# Patient Record
Sex: FEMALE | Race: BLACK OR AFRICAN AMERICAN | NOT HISPANIC OR LATINO | URBAN - METROPOLITAN AREA
[De-identification: names, ages, dates, MRNs, and addresses within clinical notes are randomized per-mention and may not be internally consistent; named-entity substitution may affect disease eponyms.]

---

## 2020-10-27 ENCOUNTER — INPATIENT (INPATIENT)
Facility: HOSPITAL | Age: 65
LOS: 1 days | Discharge: HOME | End: 2020-10-29
Attending: INTERNAL MEDICINE | Admitting: INTERNAL MEDICINE
Payer: MEDICAID

## 2020-10-27 VITALS
SYSTOLIC BLOOD PRESSURE: 148 MMHG | TEMPERATURE: 98 F | DIASTOLIC BLOOD PRESSURE: 94 MMHG | WEIGHT: 207.01 LBS | HEART RATE: 59 BPM | OXYGEN SATURATION: 100 % | HEIGHT: 66 IN | RESPIRATION RATE: 20 BRPM

## 2020-10-27 DIAGNOSIS — Z29.9 ENCOUNTER FOR PROPHYLACTIC MEASURES, UNSPECIFIED: ICD-10-CM

## 2020-10-27 DIAGNOSIS — E87.6 HYPOKALEMIA: ICD-10-CM

## 2020-10-27 DIAGNOSIS — R07.9 CHEST PAIN, UNSPECIFIED: ICD-10-CM

## 2020-10-27 DIAGNOSIS — I10 ESSENTIAL (PRIMARY) HYPERTENSION: ICD-10-CM

## 2020-10-27 DIAGNOSIS — Z98.890 OTHER SPECIFIED POSTPROCEDURAL STATES: Chronic | ICD-10-CM

## 2020-10-27 LAB
ALBUMIN SERPL ELPH-MCNC: 4.1 G/DL — SIGNIFICANT CHANGE UP (ref 3.3–5)
ALP SERPL-CCNC: 73 U/L — SIGNIFICANT CHANGE UP (ref 40–120)
ALT FLD-CCNC: 28 U/L — SIGNIFICANT CHANGE UP (ref 12–78)
ANION GAP SERPL CALC-SCNC: 5 MMOL/L — SIGNIFICANT CHANGE UP (ref 5–17)
AST SERPL-CCNC: 24 U/L — SIGNIFICANT CHANGE UP (ref 15–37)
BASOPHILS # BLD AUTO: 0.02 K/UL — SIGNIFICANT CHANGE UP (ref 0–0.2)
BASOPHILS NFR BLD AUTO: 0.3 % — SIGNIFICANT CHANGE UP (ref 0–2)
BILIRUB SERPL-MCNC: 0.6 MG/DL — SIGNIFICANT CHANGE UP (ref 0.2–1.2)
BUN SERPL-MCNC: 17 MG/DL — SIGNIFICANT CHANGE UP (ref 7–23)
CALCIUM SERPL-MCNC: 9.2 MG/DL — SIGNIFICANT CHANGE UP (ref 8.5–10.1)
CHLORIDE SERPL-SCNC: 108 MMOL/L — SIGNIFICANT CHANGE UP (ref 96–108)
CHOLEST SERPL-MCNC: 211 MG/DL — HIGH
CO2 SERPL-SCNC: 28 MMOL/L — SIGNIFICANT CHANGE UP (ref 22–31)
CREAT SERPL-MCNC: 0.98 MG/DL — SIGNIFICANT CHANGE UP (ref 0.5–1.3)
EOSINOPHIL # BLD AUTO: 0.08 K/UL — SIGNIFICANT CHANGE UP (ref 0–0.5)
EOSINOPHIL NFR BLD AUTO: 1.2 % — SIGNIFICANT CHANGE UP (ref 0–6)
GLUCOSE SERPL-MCNC: 131 MG/DL — HIGH (ref 70–99)
HCT VFR BLD CALC: 37.2 % — SIGNIFICANT CHANGE UP (ref 34.5–45)
HDLC SERPL-MCNC: 47 MG/DL — LOW
HGB BLD-MCNC: 12.2 G/DL — SIGNIFICANT CHANGE UP (ref 11.5–15.5)
IMM GRANULOCYTES NFR BLD AUTO: 0.1 % — SIGNIFICANT CHANGE UP (ref 0–1.5)
LIDOCAIN IGE QN: 114 U/L — SIGNIFICANT CHANGE UP (ref 73–393)
LIPID PNL WITH DIRECT LDL SERPL: 148 MG/DL — HIGH
LYMPHOCYTES # BLD AUTO: 1.56 K/UL — SIGNIFICANT CHANGE UP (ref 1–3.3)
LYMPHOCYTES # BLD AUTO: 23 % — SIGNIFICANT CHANGE UP (ref 13–44)
MCHC RBC-ENTMCNC: 25.2 PG — LOW (ref 27–34)
MCHC RBC-ENTMCNC: 32.8 GM/DL — SIGNIFICANT CHANGE UP (ref 32–36)
MCV RBC AUTO: 76.9 FL — LOW (ref 80–100)
MONOCYTES # BLD AUTO: 0.41 K/UL — SIGNIFICANT CHANGE UP (ref 0–0.9)
MONOCYTES NFR BLD AUTO: 6 % — SIGNIFICANT CHANGE UP (ref 2–14)
NEUTROPHILS # BLD AUTO: 4.7 K/UL — SIGNIFICANT CHANGE UP (ref 1.8–7.4)
NEUTROPHILS NFR BLD AUTO: 69.4 % — SIGNIFICANT CHANGE UP (ref 43–77)
NON HDL CHOLESTEROL: 163 MG/DL — HIGH
NRBC # BLD: 0 /100 WBCS — SIGNIFICANT CHANGE UP (ref 0–0)
NT-PROBNP SERPL-SCNC: 69 PG/ML — SIGNIFICANT CHANGE UP (ref 0–125)
PLATELET # BLD AUTO: 228 K/UL — SIGNIFICANT CHANGE UP (ref 150–400)
POTASSIUM SERPL-MCNC: 3.3 MMOL/L — LOW (ref 3.5–5.3)
POTASSIUM SERPL-MCNC: 4 MMOL/L — SIGNIFICANT CHANGE UP (ref 3.5–5.3)
POTASSIUM SERPL-SCNC: 3.3 MMOL/L — LOW (ref 3.5–5.3)
POTASSIUM SERPL-SCNC: 4 MMOL/L — SIGNIFICANT CHANGE UP (ref 3.5–5.3)
PROT SERPL-MCNC: 8 GM/DL — SIGNIFICANT CHANGE UP (ref 6–8.3)
RBC # BLD: 4.84 M/UL — SIGNIFICANT CHANGE UP (ref 3.8–5.2)
RBC # FLD: 14.5 % — SIGNIFICANT CHANGE UP (ref 10.3–14.5)
SARS-COV-2 RNA SPEC QL NAA+PROBE: SIGNIFICANT CHANGE UP
SODIUM SERPL-SCNC: 141 MMOL/L — SIGNIFICANT CHANGE UP (ref 135–145)
TRIGL SERPL-MCNC: 75 MG/DL — SIGNIFICANT CHANGE UP
TROPONIN I SERPL-MCNC: 0.02 NG/ML — SIGNIFICANT CHANGE UP (ref 0.01–0.04)
TROPONIN I SERPL-MCNC: 0.05 NG/ML — HIGH (ref 0.01–0.04)
TROPONIN I SERPL-MCNC: 0.08 NG/ML — HIGH (ref 0.01–0.04)
WBC # BLD: 6.78 K/UL — SIGNIFICANT CHANGE UP (ref 3.8–10.5)
WBC # FLD AUTO: 6.78 K/UL — SIGNIFICANT CHANGE UP (ref 3.8–10.5)

## 2020-10-27 PROCEDURE — 71045 X-RAY EXAM CHEST 1 VIEW: CPT | Mod: 26

## 2020-10-27 PROCEDURE — 99285 EMERGENCY DEPT VISIT HI MDM: CPT

## 2020-10-27 PROCEDURE — 99232 SBSQ HOSP IP/OBS MODERATE 35: CPT

## 2020-10-27 PROCEDURE — 93010 ELECTROCARDIOGRAM REPORT: CPT

## 2020-10-27 PROCEDURE — 12345: CPT | Mod: NC

## 2020-10-27 PROCEDURE — 99222 1ST HOSP IP/OBS MODERATE 55: CPT

## 2020-10-27 PROCEDURE — 93306 TTE W/DOPPLER COMPLETE: CPT | Mod: 26

## 2020-10-27 RX ORDER — METOPROLOL TARTRATE 50 MG
25 TABLET ORAL DAILY
Refills: 0 | Status: DISCONTINUED | OUTPATIENT
Start: 2020-10-28 | End: 2020-10-29

## 2020-10-27 RX ORDER — GABAPENTIN 400 MG/1
300 CAPSULE ORAL AT BEDTIME
Refills: 0 | Status: DISCONTINUED | OUTPATIENT
Start: 2020-10-27 | End: 2020-10-29

## 2020-10-27 RX ORDER — ONDANSETRON 8 MG/1
4 TABLET, FILM COATED ORAL EVERY 6 HOURS
Refills: 0 | Status: DISCONTINUED | OUTPATIENT
Start: 2020-10-27 | End: 2020-10-29

## 2020-10-27 RX ORDER — INFLUENZA VIRUS VACCINE 15; 15; 15; 15 UG/.5ML; UG/.5ML; UG/.5ML; UG/.5ML
0.5 SUSPENSION INTRAMUSCULAR ONCE
Refills: 0 | Status: DISCONTINUED | OUTPATIENT
Start: 2020-10-27 | End: 2020-10-29

## 2020-10-27 RX ORDER — NIFEDIPINE 30 MG
60 TABLET, EXTENDED RELEASE 24 HR ORAL DAILY
Refills: 0 | Status: DISCONTINUED | OUTPATIENT
Start: 2020-10-27 | End: 2020-10-28

## 2020-10-27 RX ORDER — POTASSIUM CHLORIDE 20 MEQ
40 PACKET (EA) ORAL ONCE
Refills: 0 | Status: COMPLETED | OUTPATIENT
Start: 2020-10-27 | End: 2020-10-27

## 2020-10-27 RX ORDER — ENOXAPARIN SODIUM 100 MG/ML
40 INJECTION SUBCUTANEOUS DAILY
Refills: 0 | Status: DISCONTINUED | OUTPATIENT
Start: 2020-10-27 | End: 2020-10-29

## 2020-10-27 RX ORDER — ATORVASTATIN CALCIUM 80 MG/1
80 TABLET, FILM COATED ORAL AT BEDTIME
Refills: 0 | Status: DISCONTINUED | OUTPATIENT
Start: 2020-10-27 | End: 2020-10-29

## 2020-10-27 RX ADMIN — Medication 60 MILLIGRAM(S): at 12:20

## 2020-10-27 RX ADMIN — ATORVASTATIN CALCIUM 80 MILLIGRAM(S): 80 TABLET, FILM COATED ORAL at 21:30

## 2020-10-27 RX ADMIN — GABAPENTIN 300 MILLIGRAM(S): 400 CAPSULE ORAL at 21:30

## 2020-10-27 RX ADMIN — ENOXAPARIN SODIUM 40 MILLIGRAM(S): 100 INJECTION SUBCUTANEOUS at 12:21

## 2020-10-27 RX ADMIN — Medication 40 MILLIEQUIVALENT(S): at 07:05

## 2020-10-27 NOTE — CONSULT NOTE ADULT - SUBJECTIVE AND OBJECTIVE BOX
CARDIOLOGY CONSULT NOTE    Patient is a 65y Female with a known history of :  DVT prophylaxis [Z29.9]    Essential hypertension [I10]    Hypokalemia [E87.6]    Chest pain, unspecified type [R07.9]      HPI:  64 y/o obese female w/ PMHx of ??CAD (no stents) and previously on Plavix, HTN; presents to the ED c/o mid-sternal chest pain.   Proceeded to eat a late dinner and pain recurred, this time more severe 10/10 nonradiated associated with dizziness/lightheadedness, near-syncope, nausea and mild diaphoresis...no relief w/ antacids.   Of note pt reports a history of CAD diagnosed w/ a cardiac specific CT after having a positive stress test 3 yrs ago at [facility].   Pt reports she did not have an angiogram and no stents were placed as she would not be able to tolerate DAPT due to a severe allergy to ASA (throat swelling); placed on Plavix at the time.  Pt reports in January she was taken off Plavix after a repeat cardiac CT was negative; again done for chest pain.     ECG:  sinus annmarie 54bpm; subtle lateral ST depressions   Trop peaked at 0.08 and trending down  Currently CP free.      REVIEW OF SYSTEMS:  CONSTITUTIONAL: No fever, weight loss, or fatigue  EYES: No eye pain, visual disturbances, or discharge  ENMT:  No difficulty hearing, tinnitus, vertigo; No sinus or throat pain  NECK: No pain or stiffness  BREASTS: No pain, masses, or nipple discharge  RESPIRATORY: No cough, wheezing, chills or hemoptysis; No shortness of breath  CARDIOVASCULAR: No chest pain, palpitations, dizziness, or leg swelling  GASTROINTESTINAL: No abdominal or epigastric pain. No nausea, vomiting, or hematemesis; No diarrhea or constipation. No melena or hematochezia.  GENITOURINARY: No dysuria, frequency, hematuria, or incontinence  NEUROLOGICAL: No headaches, memory loss, loss of strength, numbness, or tremors  SKIN: No itching, burning, rashes, or lesions   LYMPH NODES: No enlarged glands  ENDOCRINE: No heat or cold intolerance; No hair loss  MUSCULOSKELETAL: No joint pain or swelling; No muscle, back, or extremity pain  PSYCHIATRIC: No depression, anxiety, mood swings, or difficulty sleeping  HEME/LYMPH: No easy bruising, or bleeding gums  ALLERGY AND IMMUNOLOGIC: No hives or eczema    MEDICATIONS  (STANDING):  atorvastatin 80 milliGRAM(s) Oral at bedtime  enoxaparin Injectable 40 milliGRAM(s) SubCutaneous daily  gabapentin 300 milliGRAM(s) Oral at bedtime  influenza   Vaccine 0.5 milliLiter(s) IntraMuscular once  NIFEdipine XL 60 milliGRAM(s) Oral daily    MEDICATIONS  (PRN):  ondansetron Injectable 4 milliGRAM(s) IV Push every 6 hours PRN Nausea and/or Vomiting      ALLERGIES: aspirin (Angioedema)      FAMILY HISTORY:  No pertinent family history in first degree relatives        PHYSICAL EXAMINATION:  -----------------------------  T(C): 37 (10-27-20 @ 11:58), Max: 37 (10-27-20 @ 11:58)  HR: 60 (10-27-20 @ 11:58) (57 - 60)  BP: 146/56 (10-27-20 @ 11:58) (134/68 - 152/64)  RR: 14 (10-27-20 @ 11:58) (14 - 20)  SpO2: 97% (10-27-20 @ 11:58) (97% - 100%)    Constitutional: well developed, normal appearance, well groomed, well nourished, no deformities and no acute distress.   Eyes: the conjunctiva exhibited no abnormalities and the eyelids demonstrated no xanthelasmas.   HEENT: normal oral mucosa, no oral pallor and no oral cyanosis.   Neck: normal jugular venous A waves present, normal jugular venous V waves present and no jugular venous felipe A waves.   Pulmonary: no respiratory distress, normal respiratory rhythm and effort, no accessory muscle use and lungs were clear to auscultation bilaterally.   Cardiovascular: heart rate and rhythm were normal, normal S1 and S2 and no murmur, gallop, rub, heave or thrill are present.   Abdomen: soft, non-tender, no hepato-splenomegaly and no abdominal mass palpated.   Musculoskeletal: the gait could not be assessed..   Extremities: no clubbing of the fingernails, no localized cyanosis, no petechial hemorrhages and no ischemic changes.   Skin: normal skin color and pigmentation, no rash, no venous stasis, no skin lesions, no skin ulcer and no xanthoma was observed.   Psychiatric: oriented to person, place, and time, the affect was normal, the mood was normal and not feeling anxious.         LABS:   --------  10-27    x   |  x   |  x   ----------------------------<  x   4.0   |  x   |  x     Ca    9.2      27 Oct 2020 02:09    TPro  8.0  /  Alb  4.1  /  TBili  0.6  /  DBili  x   /  AST  24  /  ALT  28  /  AlkPhos  73  10-27                         12.2   6.78  )-----------( 228      ( 27 Oct 2020 02:09 )             37.2       10-27 @ 02:09 BNP: 69 pg/mL    10-27 @ 09:31 CPK total:--, CKMB --, Troponin I - .054 ng/mL<H>  10-27 @ 05:26 CPK total:--, CKMB --, Troponin I - .080 ng/mL<H>  10-27 @ 02:09 CPK total:--, CKMB --, Troponin I - .017 ng/mL          RADIOLOGY:  -----------------    ECG:  sinus annmarie 54bpm; subtle lateral ST depressions

## 2020-10-27 NOTE — CHART NOTE - NSCHARTNOTEFT_GEN_A_CORE
66 y/o obese female w/ PMHx of CAD, no stents, previously on Plavix, HTN, presents to the ED c/o chest pain. Pt reports she developed midsternal cp while at rest which began this evening. Please see H&P for more details. Pt was seen and examined at the bedside. Cardiology consult was appreciated. follow up with Cardiology recommendations.

## 2020-10-27 NOTE — H&P ADULT - PROBLEM SELECTOR PLAN 1
- c/w tele monitoring  - cont to trend trops   - HEART score of 6  - Given hx, ? stress vs Cath  - f/u Cardio consult, called by ED and service called in am as well  - pt allergic to ASA  - place on statin, f/u lipid panel

## 2020-10-27 NOTE — ED ADULT NURSE NOTE - NS ED NURSE RECORD ANOTHER VITAL SIGN
Notes recorded by Brenda Funk MA on 11/27/2019 at 10:27 AM EST  Normal card sent to patient   ------    Notes recorded by SILVIA Cullen on 11/27/2019 at 8:44 AM EST  I reviewed her results   Please send negative STD cultures letter  Clayton Mccormack! Yes

## 2020-10-27 NOTE — ED PROVIDER NOTE - PHYSICAL EXAMINATION
GEN: Awake, alert, interactive, NAD.  HEAD AND NECK: NC/AT. Airway patent. Neck supple.   EYES: Clear b/l. EOMI. PERRL.   ENT: Moist mucus membranes.   CARDIAC: Regular rate, regular rhythm. No evident pedal edema. No unilateral LE edema / no calf tenderness TTP.   RESP/CHEST: Normal respiratory effort with no use of accessory muscles or retractions. Clear throughout on auscultation.  ABD: Soft, non-distended, non-tender. No rebound, no guarding.   BACK: No midline spinal TTP. No CVAT.   EXTREMITIES: Moving all extremities with no apparent deformities.   SKIN: Warm, dry, intact normal color. No rash.   NEURO: AOx3, CN II-XII grossly intact, no focal deficits.   PSYCH: Appropriate mood and affect.

## 2020-10-27 NOTE — ED ADULT TRIAGE NOTE - CHIEF COMPLAINT QUOTE
Pt c/o nausea, and chills, with chest discomfort x 4 hours. H/O HTN. Pt stated she took antiacid and chest  discomfort subsided.

## 2020-10-27 NOTE — H&P ADULT - NSHPPHYSICALEXAM_GEN_ALL_CORE
PHYSICAL EXAM:    Vital Signs Last 24 Hrs  T(C): 36.9 (27 Oct 2020 06:50), Max: 36.9 (27 Oct 2020 06:50)  T(F): 98.4 (27 Oct 2020 06:50), Max: 98.4 (27 Oct 2020 06:50)  HR: 60 (27 Oct 2020 06:50) (59 - 60)  BP: 134/68 (27 Oct 2020 06:50) (134/68 - 148/94)  BP(mean): --  RR: 18 (27 Oct 2020 06:50) (18 - 20)  SpO2: 98% (27 Oct 2020 06:50) (98% - 100%)    GENERAL: Pt lying in bed comfortably in NAD  HEENT:  Atraumatic, EOMI, PERRL, conjunctiva and sclera clear, MMM  NECK: Supple, No JVD  CHEST/LUNG: Clear to auscultation bilaterally; No rales, rhonchi, wheezing or rubs. Unlabored respirations  HEART: Regular rate and rhythm; No murmurs, rubs, or gallops  ABDOMEN: Bowel sounds present; Soft, Nontender, Nondistended. No guarding or rigidity    EXTREMITIES:  2+ Peripheral Pulses, brisk capillary refill. No clubbing, cyanosis, or edema  NEUROLOGICAL:  Alert & Oriented X3, speech clear. Answers questions appropriately. Full and equal strength B/L upper and lower extremities. No deficits   MSK: FROM x 4 extremities   SKIN: No rashes or lesions PHYSICAL EXAM:    Vital Signs Last 24 Hrs  T(C): 36.9 (27 Oct 2020 06:50), Max: 36.9 (27 Oct 2020 06:50)  T(F): 98.4 (27 Oct 2020 06:50), Max: 98.4 (27 Oct 2020 06:50)  HR: 60 (27 Oct 2020 06:50) (59 - 60)  BP: 134/68 (27 Oct 2020 06:50) (134/68 - 148/94)  BP(mean): --  RR: 18 (27 Oct 2020 06:50) (18 - 20)  SpO2: 98% (27 Oct 2020 06:50) (98% - 100%)    GENERAL: Pt lying in bed comfortably in NAD  HEENT:  Atraumatic, EOMI, PERRL, conjunctiva and sclera clear, MMM  NECK: Supple,   CHEST/LUNG: Clear to auscultation bilaterally. Unlabored respirations. Chest pain not reproducible on palpation  HEART: Regular rate and rhythm, Nl S1, S2  ABDOMEN: Bowel sounds present; Soft, Nontender, Nondistended. No guarding or rigidity    EXTREMITIES:  2+ Peripheral Pulses, brisk capillary refill. No clubbing, cyanosis, or edema  NEUROLOGICAL:  Alert & Oriented X3, speech clear. Answers questions appropriately. Full and equal strength B/L upper and lower extremities. No deficits   MSK: FROM x 4 extremities   SKIN: No rashes or lesions

## 2020-10-27 NOTE — ED PROVIDER NOTE - PROGRESS NOTE DETAILS
Pt remains CP free since ED arrival, son at bedside. Rpt trop + 0.08, HEART score 4, moderate risk. Pt and son updated to results, recommend admission.

## 2020-10-27 NOTE — ED ADULT NURSE NOTE - OBJECTIVE STATEMENT
Pt who admits to a hx of HTN presents to the ED with a  c/o nausea, and chills, with chest discomfort x 4 hours. Pt stated she took antiacid and chest  discomfort subsided. Pt denies any other complaint.

## 2020-10-27 NOTE — ED PROVIDER NOTE - CLINICAL SUMMARY MEDICAL DECISION MAKING FREE TEXT BOX
66yo F w/ PMH HTN pw central chest discomfort associated w/ nausea, chills, near-syncope. AFVSS. Pt well appearing, in NAD, unremarkable physical exam. Plan: Obtain ECG, trop, CBC, CMP, CXR, BNP. Re-eval. ECG sinus annmarie, CBC, CMP w/o significant abnormalities, CXR w/o acute pathology, BNP WNL. Trop neg. Symptom onset just prior to ED arrival, will obtain delta trop. Rpt trop elevated to 0.08. Pt and son updated. HEART score 4, moderate risk. Recommend admission. Pt admitted to medicine (d/w Dr Corrigan), Cardio consulted. Pt and son understand and agree w/ this plan.

## 2020-10-27 NOTE — ED PROVIDER NOTE - OBJECTIVE STATEMENT
64yo F w/ PMH HTN pw central chest pain onset s/p eating dinner tonight. Pt took antiacid w/ improvement in chest pain, no pain currently. Pt reports at time of chest pain, pt felt nausea, chills, need to move bowels, went to bathroom, but had no BM, pt felt near-syncopal. Pt son called EMS. Pt reports hx similar sx 3yrs ago, seen at [place], pt told symptoms were d/t heart blockage, no stent placement. ROS otherwise negative. Pt feeling well earlier in day, went to work.       PMH HTN, PSH , fibroids, allergy ASA, meds as listed (formerly on AC, stopped in 2020). Neg social hx.

## 2020-10-27 NOTE — H&P ADULT - NSHPLABSRESULTS_GEN_ALL_CORE
T(C): 36.9 (10-27-20 @ 06:50), Max: 36.9 (10-27-20 @ 06:50)  HR: 60 (10-27-20 @ 06:50) (59 - 60)  BP: 134/68 (10-27-20 @ 06:50) (134/68 - 148/94)  RR: 18 (10-27-20 @ 06:50) (18 - 20)  SpO2: 98% (10-27-20 @ 06:50) (98% - 100%)                        12.2   6.78  )-----------( 228      ( 27 Oct 2020 02:09 )             37.2     10-27    141  |  108  |  17  ----------------------------<  131<H>  3.3<L>   |  28  |  0.98    Ca    9.2      27 Oct 2020 02:09    TPro  8.0  /  Alb  4.1  /  TBili  0.6  /  DBili  x   /  AST  24  /  ALT  28  /  AlkPhos  73  10-27    LIVER FUNCTIONS - ( 27 Oct 2020 02:09 )  Alb: 4.1 g/dL / Pro: 8.0 gm/dL / ALK PHOS: 73 U/L / ALT: 28 U/L / AST: 24 U/L / GGT: x                   ondansetron Injectable 4 milliGRAM(s) IV Push every 6 hours PRN T(C): 36.9 (10-27-20 @ 06:50), Max: 36.9 (10-27-20 @ 06:50)  HR: 60 (10-27-20 @ 06:50) (59 - 60)  BP: 134/68 (10-27-20 @ 06:50) (134/68 - 148/94)  RR: 18 (10-27-20 @ 06:50) (18 - 20)  SpO2: 98% (10-27-20 @ 06:50) (98% - 100%)                        12.2   6.78  )-----------( 228      ( 27 Oct 2020 02:09 )             37.2     10-27    141  |  108  |  17  ----------------------------<  131<H>  3.3<L>   |  28  |  0.98    Ca    9.2      27 Oct 2020 02:09    TPro  8.0  /  Alb  4.1  /  TBili  0.6  /  DBili  x   /  AST  24  /  ALT  28  /  AlkPhos  73  10-27    LIVER FUNCTIONS - ( 27 Oct 2020 02:09 )  Alb: 4.1 g/dL / Pro: 8.0 gm/dL / ALK PHOS: 73 U/L / ALT: 28 U/L / AST: 24 U/L / GGT: x             EKG - Sinus annmarie       ondansetron Injectable 4 milliGRAM(s) IV Push every 6 hours PRN T(C): 36.9 (10-27-20 @ 06:50), Max: 36.9 (10-27-20 @ 06:50)  HR: 60 (10-27-20 @ 06:50) (59 - 60)  BP: 134/68 (10-27-20 @ 06:50) (134/68 - 148/94)  RR: 18 (10-27-20 @ 06:50) (18 - 20)  SpO2: 98% (10-27-20 @ 06:50) (98% - 100%)                        12.2   6.78  )-----------( 228      ( 27 Oct 2020 02:09 )             37.2     10-27    141  |  108  |  17  ----------------------------<  131<H>  3.3<L>   |  28  |  0.98    Ca    9.2      27 Oct 2020 02:09    TPro  8.0  /  Alb  4.1  /  TBili  0.6  /  DBili  x   /  AST  24  /  ALT  28  /  AlkPhos  73  10-27    LIVER FUNCTIONS - ( 27 Oct 2020 02:09 )  Alb: 4.1 g/dL / Pro: 8.0 gm/dL / ALK PHOS: 73 U/L / ALT: 28 U/L / AST: 24 U/L / GGT: x             EKG - Sinus annmarie at 54 bpm, nonspecific ST -T changes      ondansetron Injectable 4 milliGRAM(s) IV Push every 6 hours PRN

## 2020-10-27 NOTE — CONSULT NOTE ADULT - ASSESSMENT
66 y/o obese female w/ PMHx of ??CAD (no stents) and previously on Plavix, HTN; presents to the ED c/o mid-sternal chest pain.   Proceeded to eat a late dinner and pain recurred, this time more severe 10/10 nonradiated associated with dizziness/lightheadedness, near-syncope, nausea and mild diaphoresis...no relief w/ antacids.   Of note pt reports a history of CAD diagnosed w/ a cardiac specific CT after having a positive stress test 3 yrs ago at [facility].   Pt reports she did not have an angiogram and no stents were placed as she would not be able to tolerate DAPT due to a severe allergy to ASA (throat swelling); placed on Plavix at the time.  Pt reports in January she was taken off Plavix after a repeat cardiac CT was negative; again done for chest pain.     ECG:  sinus annmarie 54bpm; subtle lateral ST depressions   Trop peaked at 0.08 and trending down  Currently CP free.    -monitor on tele  -Rachel already trending down  -cont metoprolol/statin  -no asa 2/2 allergy  -would restart plavix with a 600mg load today  -2D echo pending  -discussed ischemic eval... stress vs cath.  Pt to discuss with son and get back to me.

## 2020-10-27 NOTE — H&P ADULT - HISTORY OF PRESENT ILLNESS
66 y/o female 64 y/o female w/ PMHx of CAD, no stents, previously on Plavix, HTN, presents to the ED c/o chest pain. Pt reports she developed midsternal cp while at rest which began this evening. Pt unable to qualify pain however states it last about a minute then resolved on its own. Pt reports she then proceeded to eat a late dinner and pain recurred, this time more severe 10/10 nonradiated associated with dizziness/lightheadedness, feeling "lifeless", nausea and mild diaphoresis thus presented to the ED for evaluation. Of note pt reports a history of CAD diagnosed w/ Cardiac specific CT after having a positive stress test 3 yrs ago at [facility]. Pt reports she did not have an angiogram and no stents were placed as she would not be able to tolerate DAPT due to a severe allergy to ASA. Pt reports she was placed on Plavix at the time and followed up regularly at that Clinic for 3 yrs. Pt reports in January she was taken off Plavix after a rpt cardiac CT was negative.     In ED, vitals stable, initial labs showed trop wnl however rpt Trop 0.080. EKG no sig ischemic changes. 64 y/o female w/ PMHx of CAD, no stents, previously on Plavix, HTN, presents to the ED c/o chest pain. Pt reports she developed midsternal cp while at rest which began this evening. Pt unable to qualify pain however states it last about a minute then resolved on its own. Pt reports she then proceeded to eat a late dinner and pain recurred, this time more severe 10/10 nonradiated associated with dizziness/lightheadedness, feeling "lifeless", "almost passed out", nausea and mild diaphoresis thus presented to the ED for evaluation. Of note pt reports a history of CAD diagnosed w/ Cardiac specific CT after having a positive stress test 3 yrs ago at [facility]. Pt reports she did not have an angiogram and no stents were placed as she would not be able to tolerate DAPT due to a severe allergy to ASA. Pt reports she was placed on Plavix at the time and followed up regularly at that Clinic for 3 yrs. Pt reports in January she was taken off Plavix after a rpt cardiac CT was negative.     In ED, vitals stable, initial labs showed trop wnl however rpt Trop 0.080. EKG no sig ischemic changes. 66 y/o obese female w/ PMHx of CAD, no stents, previously on Plavix, HTN, presents to the ED c/o chest pain. Pt reports she developed midsternal cp while at rest which began this evening. Pt unable to qualify pain however states it last about a minute then resolved on its own. Pt reports she then proceeded to eat a late dinner and pain recurred, this time more severe 10/10 nonradiated associated with dizziness/lightheadedness, feeling "lifeless", "almost passed out", nausea and mild diaphoresis thus presented to the ED for evaluation. Of note pt reports a history of CAD diagnosed w/ Cardiac specific CT after having a positive stress test 3 yrs ago at [facility]. Pt reports she did not have an angiogram and no stents were placed as she would not be able to tolerate DAPT due to a severe allergy to ASA. Pt reports she was placed on Plavix at the time and followed up regularly at that Clinic for 3 yrs. Pt reports in January she was taken off Plavix after a rpt cardiac CT was negative.     In ED, vitals stable, initial labs showed trop wnl however rpt Trop 0.080. EKG no sig ischemic changes.

## 2020-10-28 LAB
HCV AB S/CO SERPL IA: 0.06 S/CO — SIGNIFICANT CHANGE UP (ref 0–0.99)
HCV AB SERPL-IMP: SIGNIFICANT CHANGE UP
SARS-COV-2 IGG SERPL QL IA: NEGATIVE — SIGNIFICANT CHANGE UP
SARS-COV-2 IGM SERPL IA-ACNC: <3.8 AU/ML — SIGNIFICANT CHANGE UP

## 2020-10-28 PROCEDURE — 99239 HOSP IP/OBS DSCHRG MGMT >30: CPT

## 2020-10-28 PROCEDURE — 93018 CV STRESS TEST I&R ONLY: CPT

## 2020-10-28 RX ORDER — ACETAMINOPHEN 500 MG
650 TABLET ORAL EVERY 6 HOURS
Refills: 0 | Status: DISCONTINUED | OUTPATIENT
Start: 2020-10-28 | End: 2020-10-29

## 2020-10-28 RX ORDER — REGADENOSON 0.08 MG/ML
0.4 INJECTION, SOLUTION INTRAVENOUS ONCE
Refills: 0 | Status: COMPLETED | OUTPATIENT
Start: 2020-10-28 | End: 2020-10-28

## 2020-10-28 RX ORDER — CLOPIDOGREL BISULFATE 75 MG/1
75 TABLET, FILM COATED ORAL ONCE
Refills: 0 | Status: COMPLETED | OUTPATIENT
Start: 2020-10-28 | End: 2020-10-28

## 2020-10-28 RX ORDER — NIFEDIPINE 30 MG
30 TABLET, EXTENDED RELEASE 24 HR ORAL DAILY
Refills: 0 | Status: DISCONTINUED | OUTPATIENT
Start: 2020-10-28 | End: 2020-10-29

## 2020-10-28 RX ADMIN — Medication 650 MILLIGRAM(S): at 22:20

## 2020-10-28 RX ADMIN — GABAPENTIN 300 MILLIGRAM(S): 400 CAPSULE ORAL at 21:23

## 2020-10-28 RX ADMIN — ENOXAPARIN SODIUM 40 MILLIGRAM(S): 100 INJECTION SUBCUTANEOUS at 11:57

## 2020-10-28 RX ADMIN — Medication 60 MILLIGRAM(S): at 05:31

## 2020-10-28 RX ADMIN — CLOPIDOGREL BISULFATE 75 MILLIGRAM(S): 75 TABLET, FILM COATED ORAL at 15:54

## 2020-10-28 RX ADMIN — ATORVASTATIN CALCIUM 80 MILLIGRAM(S): 80 TABLET, FILM COATED ORAL at 21:23

## 2020-10-28 RX ADMIN — Medication 650 MILLIGRAM(S): at 21:23

## 2020-10-28 RX ADMIN — REGADENOSON 0.4 MILLIGRAM(S): 0.08 INJECTION, SOLUTION INTRAVENOUS at 10:41

## 2020-10-28 NOTE — DISCHARGE NOTE PROVIDER - NSDCCPCAREPLAN_GEN_ALL_CORE_FT
PRINCIPAL DISCHARGE DIAGNOSIS  Diagnosis: Chest pain, unspecified type  Assessment and Plan of Treatment: Stress Test was done and was Negative.

## 2020-10-28 NOTE — DISCHARGE NOTE PROVIDER - NSDCMRMEDTOKEN_GEN_ALL_CORE_FT
GABAPENTIN 300MG CAPSULES: TK ONE C PO QD  isosorbide mononitrate 30 mg oral tablet, extended release: 1 tab(s) orally once a day (in the morning)  METOPROLOL ER SUCCINATE 25MG TABS: TK 1 T PO QD  NIFEDIPINE 60MG ER (CC) TABLETS: TK 1 T PO QD  Singulair 10 mg oral tablet: 1 tab(s) orally once a day  Ventolin HFA 90 mcg/inh inhalation aerosol: 2 puff(s) inhaled every 6 hours, As Needed  Vitamin D2 50,000 intl units (1.25 mg) oral capsule: 1 cap(s) orally once a week

## 2020-10-28 NOTE — DISCHARGE NOTE PROVIDER - HOSPITAL COURSE
65F with CAD and HTN admitted for Chest Pain.  For full details of her presenting symptoms please refer to the H&P for this encounter.  Patient was admitted to our service and she serial troponins that were mildly elevated but overall flat.  Cardiology was consulted and patient underwent an EKG Stress test that was negative for ischemic changes.  She follows up with Cardiology at [facility] and wishes to have further workup and follow up over there.  Patient was optimized for discharge from a cardiac and medicine perspective.         PHYSICAL EXAM:  Vital Signs Last 24 Hrs  T(C): 36.8 (28 Oct 2020 12:47), Max: 37.2 (27 Oct 2020 20:40)  T(F): 98.2 (28 Oct 2020 12:47), Max: 98.9 (27 Oct 2020 20:40)  HR: 63 (28 Oct 2020 12:47) (48 - 73)  BP: 141/63 (28 Oct 2020 12:47) (117/53 - 164/71)  BP(mean): --  RR: 16 (28 Oct 2020 12:47) (13 - 18)  SpO2: 96% (28 Oct 2020 12:47) (95% - 98%)    GENERAL: NAD, well-groomed, well-developed  HEAD:  Atraumatic, Normocephalic  EYES: EOMI, PERRLA, conjunctiva and sclera clear  ENMT: No tonsillar erythema, exudates, or enlargement; Moist mucous membranes, Good dentition, No lesions  NECK: Supple, No JVD, Normal thyroid  NERVOUS SYSTEM:  Alert & Oriented X3, Good concentration; Motor Strength 5/5 B/L upper and lower extremities; CHEST/LUNG: Clear to auscultation bilaterally; No rales, rhonchi, wheezing, or rubs  HEART: Regular rate and rhythm; No murmurs, rubs, or gallops  ABDOMEN: Soft, Nontender, Nondistended; Bowel sounds present  EXTREMITIES:  2+ Peripheral Pulses, No clubbing, cyanosis, or edema  LYMPH: No lymphadenopathy noted  SKIN: No rashes or lesions

## 2020-10-28 NOTE — DISCHARGE NOTE PROVIDER - NSDCFUADDINST_GEN_ALL_CORE_FT
You were admitted for chest pain and were worked up with a cardiac Stress Test.  Results were discussed with you.  Please follow up with your cardiologist at [facility] as discussed.  No changes in your medications were made.

## 2020-10-29 VITALS
OXYGEN SATURATION: 97 % | TEMPERATURE: 98 F | RESPIRATION RATE: 18 BRPM | SYSTOLIC BLOOD PRESSURE: 119 MMHG | DIASTOLIC BLOOD PRESSURE: 67 MMHG | HEART RATE: 54 BPM

## 2020-10-29 RX ADMIN — Medication 25 MILLIGRAM(S): at 05:44

## 2020-10-29 RX ADMIN — Medication 30 MILLIGRAM(S): at 05:44

## 2020-10-29 NOTE — CHART NOTE - NSCHARTNOTEFT_GEN_A_CORE
Work Letter     To Whom It May Concern,    CLAUDETTE GREEN was admitted to [facility] on 10/27 treated and discharged on 10/29, without any restriction of activity. Ms. Navarro is cleared to return to work on November 1, 2020.    If any further questions or concerns, please contact us.    Thanks,  Tiffanie Alfaro PA-C    Internal Medicine  [address] [place] [id number] [phone]

## 2020-10-29 NOTE — DISCHARGE NOTE NURSING/CASE MANAGEMENT/SOCIAL WORK - PATIENT PORTAL LINK FT
You can access the FollowMyHealth Patient Portal offered by [facility] by registering at the following website: [email or web address]. By joining [facility]’s FollowMyHealth portal, you will also be able to view your health information using other applications (apps) compatible with our system.

## 2020-11-01 ENCOUNTER — OUTPATIENT (OUTPATIENT)
Dept: OUTPATIENT SERVICES | Facility: HOSPITAL | Age: 65
LOS: 1 days | End: 2020-11-01
Payer: MEDICAID

## 2020-11-01 DIAGNOSIS — Z98.890 OTHER SPECIFIED POSTPROCEDURAL STATES: Chronic | ICD-10-CM

## 2020-11-03 DIAGNOSIS — E87.6 HYPOKALEMIA: ICD-10-CM

## 2020-11-03 DIAGNOSIS — R07.9 CHEST PAIN, UNSPECIFIED: ICD-10-CM

## 2020-11-03 DIAGNOSIS — I25.10 ATHEROSCLEROTIC HEART DISEASE OF NATIVE CORONARY ARTERY WITHOUT ANGINA PECTORIS: ICD-10-CM

## 2020-11-03 DIAGNOSIS — E66.9 OBESITY, UNSPECIFIED: ICD-10-CM

## 2020-11-03 DIAGNOSIS — Z88.6 ALLERGY STATUS TO ANALGESIC AGENT: ICD-10-CM

## 2020-11-03 DIAGNOSIS — I10 ESSENTIAL (PRIMARY) HYPERTENSION: ICD-10-CM

## 2020-11-05 DIAGNOSIS — Z71.89 OTHER SPECIFIED COUNSELING: ICD-10-CM

## 2020-11-05 PROBLEM — I10 ESSENTIAL (PRIMARY) HYPERTENSION: Chronic | Status: ACTIVE | Noted: 2020-10-27

## 2020-11-05 PROBLEM — I25.10 ATHEROSCLEROTIC HEART DISEASE OF NATIVE CORONARY ARTERY WITHOUT ANGINA PECTORIS: Chronic | Status: ACTIVE | Noted: 2020-10-27

## 2021-03-02 ENCOUNTER — INPATIENT (INPATIENT)
Facility: HOSPITAL | Age: 66
LOS: 2 days | Discharge: HOME | End: 2021-03-05
Attending: INTERNAL MEDICINE | Admitting: INTERNAL MEDICINE
Payer: MEDICAID

## 2021-03-02 VITALS
HEIGHT: 66 IN | SYSTOLIC BLOOD PRESSURE: 112 MMHG | HEART RATE: 54 BPM | RESPIRATION RATE: 18 BRPM | OXYGEN SATURATION: 100 % | TEMPERATURE: 99 F | DIASTOLIC BLOOD PRESSURE: 79 MMHG

## 2021-03-02 DIAGNOSIS — Z98.890 OTHER SPECIFIED POSTPROCEDURAL STATES: Chronic | ICD-10-CM

## 2021-03-02 LAB
ALBUMIN SERPL ELPH-MCNC: 4.1 G/DL — SIGNIFICANT CHANGE UP (ref 3.3–5)
ALP SERPL-CCNC: 77 U/L — SIGNIFICANT CHANGE UP (ref 40–120)
ALT FLD-CCNC: 19 U/L — SIGNIFICANT CHANGE UP (ref 12–78)
ANION GAP SERPL CALC-SCNC: 6 MMOL/L — SIGNIFICANT CHANGE UP (ref 5–17)
APPEARANCE UR: CLEAR — SIGNIFICANT CHANGE UP
AST SERPL-CCNC: 13 U/L — LOW (ref 15–37)
BACTERIA # UR AUTO: ABNORMAL
BASOPHILS # BLD AUTO: 0.02 K/UL — SIGNIFICANT CHANGE UP (ref 0–0.2)
BASOPHILS NFR BLD AUTO: 0.3 % — SIGNIFICANT CHANGE UP (ref 0–2)
BILIRUB SERPL-MCNC: 0.7 MG/DL — SIGNIFICANT CHANGE UP (ref 0.2–1.2)
BILIRUB UR-MCNC: NEGATIVE — SIGNIFICANT CHANGE UP
BUN SERPL-MCNC: 12 MG/DL — SIGNIFICANT CHANGE UP (ref 7–23)
CALCIUM SERPL-MCNC: 9.1 MG/DL — SIGNIFICANT CHANGE UP (ref 8.5–10.1)
CHLORIDE SERPL-SCNC: 107 MMOL/L — SIGNIFICANT CHANGE UP (ref 96–108)
CK MB BLD-MCNC: 0.5 % — SIGNIFICANT CHANGE UP (ref 0–3.5)
CK MB CFR SERPL CALC: 1.2 NG/ML — SIGNIFICANT CHANGE UP (ref 0.5–3.6)
CK SERPL-CCNC: 255 U/L — HIGH (ref 26–192)
CO2 SERPL-SCNC: 28 MMOL/L — SIGNIFICANT CHANGE UP (ref 22–31)
COLOR SPEC: YELLOW — SIGNIFICANT CHANGE UP
CREAT SERPL-MCNC: 0.96 MG/DL — SIGNIFICANT CHANGE UP (ref 0.5–1.3)
DIFF PNL FLD: NEGATIVE — SIGNIFICANT CHANGE UP
EOSINOPHIL # BLD AUTO: 0.06 K/UL — SIGNIFICANT CHANGE UP (ref 0–0.5)
EOSINOPHIL NFR BLD AUTO: 1 % — SIGNIFICANT CHANGE UP (ref 0–6)
EPI CELLS # UR: SIGNIFICANT CHANGE UP
GLUCOSE SERPL-MCNC: 96 MG/DL — SIGNIFICANT CHANGE UP (ref 70–99)
GLUCOSE UR QL: NEGATIVE MG/DL — SIGNIFICANT CHANGE UP
HCT VFR BLD CALC: 38.5 % — SIGNIFICANT CHANGE UP (ref 34.5–45)
HGB BLD-MCNC: 12.7 G/DL — SIGNIFICANT CHANGE UP (ref 11.5–15.5)
IMM GRANULOCYTES NFR BLD AUTO: 0.2 % — SIGNIFICANT CHANGE UP (ref 0–1.5)
KETONES UR-MCNC: NEGATIVE — SIGNIFICANT CHANGE UP
LEUKOCYTE ESTERASE UR-ACNC: NEGATIVE — SIGNIFICANT CHANGE UP
LYMPHOCYTES # BLD AUTO: 1.42 K/UL — SIGNIFICANT CHANGE UP (ref 1–3.3)
LYMPHOCYTES # BLD AUTO: 24.5 % — SIGNIFICANT CHANGE UP (ref 13–44)
MCHC RBC-ENTMCNC: 25 PG — LOW (ref 27–34)
MCHC RBC-ENTMCNC: 33 GM/DL — SIGNIFICANT CHANGE UP (ref 32–36)
MCV RBC AUTO: 75.6 FL — LOW (ref 80–100)
MONOCYTES # BLD AUTO: 0.42 K/UL — SIGNIFICANT CHANGE UP (ref 0–0.9)
MONOCYTES NFR BLD AUTO: 7.3 % — SIGNIFICANT CHANGE UP (ref 2–14)
NEUTROPHILS # BLD AUTO: 3.86 K/UL — SIGNIFICANT CHANGE UP (ref 1.8–7.4)
NEUTROPHILS NFR BLD AUTO: 66.7 % — SIGNIFICANT CHANGE UP (ref 43–77)
NITRITE UR-MCNC: NEGATIVE — SIGNIFICANT CHANGE UP
NRBC # BLD: 0 /100 WBCS — SIGNIFICANT CHANGE UP (ref 0–0)
PH UR: 7 — SIGNIFICANT CHANGE UP (ref 5–8)
PLATELET # BLD AUTO: 250 K/UL — SIGNIFICANT CHANGE UP (ref 150–400)
POTASSIUM SERPL-MCNC: 4.2 MMOL/L — SIGNIFICANT CHANGE UP (ref 3.5–5.3)
POTASSIUM SERPL-SCNC: 4.2 MMOL/L — SIGNIFICANT CHANGE UP (ref 3.5–5.3)
PROT SERPL-MCNC: 7.7 GM/DL — SIGNIFICANT CHANGE UP (ref 6–8.3)
PROT UR-MCNC: 30 MG/DL
RAPID RVP RESULT: SIGNIFICANT CHANGE UP
RBC # BLD: 5.09 M/UL — SIGNIFICANT CHANGE UP (ref 3.8–5.2)
RBC # FLD: 14.3 % — SIGNIFICANT CHANGE UP (ref 10.3–14.5)
SARS-COV-2 RNA SPEC QL NAA+PROBE: SIGNIFICANT CHANGE UP
SODIUM SERPL-SCNC: 141 MMOL/L — SIGNIFICANT CHANGE UP (ref 135–145)
SP GR SPEC: 1 — LOW (ref 1.01–1.02)
TROPONIN I SERPL-MCNC: 0.05 NG/ML — HIGH (ref 0.01–0.04)
TROPONIN I SERPL-MCNC: 0.11 NG/ML — HIGH (ref 0.01–0.04)
TROPONIN I SERPL-MCNC: 0.11 NG/ML — HIGH (ref 0.01–0.04)
UROBILINOGEN FLD QL: NEGATIVE MG/DL — SIGNIFICANT CHANGE UP
WBC # BLD: 5.79 K/UL — SIGNIFICANT CHANGE UP (ref 3.8–10.5)
WBC # FLD AUTO: 5.79 K/UL — SIGNIFICANT CHANGE UP (ref 3.8–10.5)
WBC UR QL: SIGNIFICANT CHANGE UP

## 2021-03-02 PROCEDURE — 71045 X-RAY EXAM CHEST 1 VIEW: CPT | Mod: 26

## 2021-03-02 PROCEDURE — 99285 EMERGENCY DEPT VISIT HI MDM: CPT

## 2021-03-02 PROCEDURE — 99223 1ST HOSP IP/OBS HIGH 75: CPT

## 2021-03-02 PROCEDURE — 93010 ELECTROCARDIOGRAM REPORT: CPT

## 2021-03-02 RX ORDER — METOPROLOL TARTRATE 50 MG
25 TABLET ORAL DAILY
Refills: 0 | Status: DISCONTINUED | OUTPATIENT
Start: 2021-03-02 | End: 2021-03-05

## 2021-03-02 RX ORDER — NIFEDIPINE 30 MG
60 TABLET, EXTENDED RELEASE 24 HR ORAL DAILY
Refills: 0 | Status: DISCONTINUED | OUTPATIENT
Start: 2021-03-02 | End: 2021-03-05

## 2021-03-02 RX ORDER — MONTELUKAST 4 MG/1
10 TABLET, CHEWABLE ORAL DAILY
Refills: 0 | Status: DISCONTINUED | OUTPATIENT
Start: 2021-03-02 | End: 2021-03-05

## 2021-03-02 RX ORDER — CLOPIDOGREL BISULFATE 75 MG/1
75 TABLET, FILM COATED ORAL DAILY
Refills: 0 | Status: DISCONTINUED | OUTPATIENT
Start: 2021-03-02 | End: 2021-03-05

## 2021-03-02 RX ORDER — GABAPENTIN 400 MG/1
300 CAPSULE ORAL DAILY
Refills: 0 | Status: DISCONTINUED | OUTPATIENT
Start: 2021-03-02 | End: 2021-03-05

## 2021-03-02 RX ORDER — HEPARIN SODIUM 5000 [USP'U]/ML
5000 INJECTION INTRAVENOUS; SUBCUTANEOUS EVERY 12 HOURS
Refills: 0 | Status: DISCONTINUED | OUTPATIENT
Start: 2021-03-02 | End: 2021-03-05

## 2021-03-02 RX ORDER — INFLUENZA VIRUS VACCINE 15; 15; 15; 15 UG/.5ML; UG/.5ML; UG/.5ML; UG/.5ML
0.5 SUSPENSION INTRAMUSCULAR ONCE
Refills: 0 | Status: DISCONTINUED | OUTPATIENT
Start: 2021-03-02 | End: 2021-03-05

## 2021-03-02 RX ORDER — SODIUM CHLORIDE 9 MG/ML
1000 INJECTION INTRAMUSCULAR; INTRAVENOUS; SUBCUTANEOUS ONCE
Refills: 0 | Status: COMPLETED | OUTPATIENT
Start: 2021-03-02 | End: 2021-03-02

## 2021-03-02 RX ADMIN — HEPARIN SODIUM 5000 UNIT(S): 5000 INJECTION INTRAVENOUS; SUBCUTANEOUS at 18:31

## 2021-03-02 RX ADMIN — CLOPIDOGREL BISULFATE 75 MILLIGRAM(S): 75 TABLET, FILM COATED ORAL at 18:31

## 2021-03-02 RX ADMIN — SODIUM CHLORIDE 1000 MILLILITER(S): 9 INJECTION INTRAMUSCULAR; INTRAVENOUS; SUBCUTANEOUS at 14:50

## 2021-03-02 RX ADMIN — SODIUM CHLORIDE 1000 MILLILITER(S): 9 INJECTION INTRAMUSCULAR; INTRAVENOUS; SUBCUTANEOUS at 17:58

## 2021-03-02 NOTE — ED PROVIDER NOTE - OBJECTIVE STATEMENT
65 year old female w/PMH of CAD, HLD, HTN, asthma presents to the ED for nausea, dizziness, lightheadedness and near syncopal episode after taking isosorbide this morning. Pt reports this is a new medication since October and has had nausea with it before. Pt feels her symptoms were related to her medication. Denies fever/chills, cough, SOB, CP, palpitations or V/D. Pt currently asymptomatic. 65 year old female w/PMH of CAD, HLD, HTN, asthma presents to the ED for nausea, dizziness, lightheadedness and near syncopal episode after taking isosorbide this morning, reports . Pt reports this is a new medication since October and has had nausea with it before. Pt feels her symptoms were related to her medication. Denies fever/chills, cough, SOB, CP, palpitations or V/D. Pt currently asymptomatic.

## 2021-03-02 NOTE — H&P ADULT - HISTORY OF PRESENT ILLNESS
65 year old female w/PMH of CAD, HLD, HTN, asthma presents to the ED for nausea, dizziness, lightheadedness and near syncopal episode after taking isosorbide this morning.  Per pt this is a new medication since October and has had nausea with it before. Pt feels her symptoms were related to her medication. Denies fever/chills, cough, SOB, CP, palpitations or V/D. Pt currently asymptomatic. In ED trop is 0.049. ED request to admit. 65 year old female w/PMH of CAD, HLD, HTN, asthma presents to the ED for nausea, dizziness, lightheadedness and near syncopal episode after taking isosorbide this morning.  Per pt this is a new medication since October and has had nausea with it before. Pt feels her symptoms were related to her medication. Denies fever/chills, cough, SOB, palpitations or V/D. Pt currently asymptomatic. In ED trop is 0.049. Per pt earlier today while waiting in ED did have a brief episode of substernal chest pain with radiation to left shoulder pressure like but has resolved on its own. Pt admitted for near syncope and chest pain.  65 year old female PMH of CAD, HLD, HTN, asthma presents to the ED for nausea, dizziness, lightheadedness and near syncopal episode after taking isosorbide this morning.  Per pt this is a new medication since October and has had nausea with it before. Pt feels her symptoms were related to her medication. Denies fever/chills, cough, SOB, palpitations or V/D. Pt currently asymptomatic. In ED trop is 0.049. Per pt earlier today while waiting in ED did have a brief episode of substernal chest pain with radiation to left shoulder pressure like but has resolved on its own.     Pt admitted for near syncope and chest pain eval. Had stress test here October 2020.   65 year old female PMH of CAD, HLD, HTN, asthma presents to the ED for nausea, dizziness, lightheadedness and near syncopal episode after taking isosorbide this morning.  Per pt this is a new medication since October and has had nausea with it before. Pt feels her symptoms were related to her medication. Denies fever/chills, cough, SOB, palpitations or V/D. Pt currently asymptomatic. In ED trop is 0.049. Per pt earlier today while waiting in ED did have a brief episode of substernal chest pain with radiation to left shoulder pressure like but has resolved on its own.     Pt admitted for near syncope and chest pain eval. Had stress test here October 2020 which was a normal study.

## 2021-03-02 NOTE — H&P ADULT - NSHPLABSRESULTS_GEN_ALL_CORE
12.7   5.79  )-----------( 250      ( 02 Mar 2021 14:55 )             38.5       03-02    141  |  107  |  12  ----------------------------<  96  4.2   |  28  |  0.96    Ca    9.1      02 Mar 2021 14:54    TPro  7.7  /  Alb  4.1  /  TBili  0.7  /  DBili  x   /  AST  13<L>  /  ALT  19  /  AlkPhos  77  03-02      CARDIAC MARKERS ( 02 Mar 2021 14:54 )  .049 ng/mL / x     / 255 U/L / x     / 1.2 ng/mL

## 2021-03-02 NOTE — H&P ADULT - ASSESSMENT
65 year old female w/PMH of CAD, HLD, HTN, asthma presents to the ED for nausea, dizziness, lightheadedness and near syncopal episode after taking isosorbide this morning.  Per pt this is a new medication since October and has had nausea with it before. Pt feels her symptoms were related to her medication. Denies fever/chills, cough, SOB, CP, palpitations or V/D. Pt currently asymptomatic. In ED trop is 0.049. ED request to admit.    1) Positive trop rule out ACS  -  65 year old female w/PMH of CAD, HLD, HTN, asthma presents to the ED for nausea, dizziness, lightheadedness and near syncopal episode after taking isosorbide this morning.  Per pt this is a new medication since October and has had nausea with it before. Pt feels her symptoms were related to her medication. Denies fever/chills, cough, SOB, palpitations or V/D. Pt currently asymptomatic. In ED trop is 0.049. Per pt earlier today while waiting in ED did have a brief episode of substernal chest pain with radiation to left shoulder pressure like but has resolved on its own. Pt admitted for near syncope and chest pain.     1) Near syncope with chest pain rule out ACS  - Admit to tele  - 1st trop mild elevated at 0.049, serial trop ordered, currently chest pain free.  - echo ordered  - per ED EKG no new finding  - Cardio Dr. Hernandez consulted by ED  - check A1c and fasting lipid  - continue Plavix and metoprolol    2) HTN  - continue nifedipine and metoprolol  - for now hold Imdur due to sx of n/v, BP currently stable  - monitor    3) Asthma  - stable, continue singular    4)  Neuropathy  - on gabapentin    5) Prophylactic measure  - DVT ppx: heparin SQ 65 year old female w/PMH of CAD, HLD, HTN, asthma presents to the ED for nausea, dizziness, lightheadedness and near syncopal episode after taking isosorbide this morning.  Per pt this is a new medication since October and has had nausea with it before. Pt feels her symptoms were related to her medication. Denies fever/chills, cough, SOB, palpitations or V/D. Pt currently asymptomatic. In ED trop is 0.049. Per pt earlier today while waiting in ED did have a brief episode of substernal chest pain with radiation to left shoulder pressure like but has resolved on its own. Pt admitted for near syncope and chest pain.     1) Near syncope with chest pain rule out ACS  - Admit to tele  - 1st trop mild elevated at 0.049, serial trop ordered, currently chest pain free.  - echo ordered  - per ED EKG no new finding, Cardio Dr. Hernandez consulted by ED  - check A1c and fasting lipid  - continue Plavix and metoprolol, cannot take ASA.     Had stress test here October 2020.      2) HTN  - continue nifedipine and metoprolol  - for now hold Imdur due to sx of n/v, BP currently stable  - monitor    3) Asthma  - stable, continue singular    4)  Neuropathy  - on gabapentin    Seen and examined in ER. Agree [facility] tele hospitalist above note and plan. Follow trop trend and TTE in AM. Continue all present meds.  65 year old female w/PMH of CAD, HLD, HTN, asthma presents to the ED for nausea, dizziness, lightheadedness and near syncopal episode after taking isosorbide this morning.  Per pt this is a new medication since October and has had nausea with it before. Pt feels her symptoms were related to her medication. Denies fever/chills, cough, SOB, palpitations or V/D. Pt currently asymptomatic. In ED trop is 0.049. Per pt earlier today while waiting in ED did have a brief episode of substernal chest pain with radiation to left shoulder pressure like but has resolved on its own. Pt admitted for near syncope and chest pain.     1) Near syncope with chest pain rule out ACS  - Admit to tele  - 1st trop mild elevated at 0.049, serial trop ordered, currently chest pain free.  - echo ordered  - per ED EKG no new finding, Cardio Dr. Hernandez consulted by ED  - check A1c and fasting lipid  - continue Plavix and metoprolol, cannot take ASA.     Had stress test here October 2020, normal study.       2) HTN  - continue nifedipine and metoprolol  - for now hold Imdur due to sx of n/v, BP currently stable  - monitor    3) Asthma  - stable, continue singular    4)  Neuropathy  - on gabapentin    Seen and examined in ER. Agree [facility] tele hospitalist above note and plan. Follow trop trend and TTE in AM. Continue all present meds.

## 2021-03-02 NOTE — ED PROVIDER NOTE - CLINICAL SUMMARY MEDICAL DECISION MAKING FREE TEXT BOX
pt presented with nausea, dizziness and near syncopal episode shortly after taking her bp medication, trop found to be elevated, normal ekg, pt admitted to rule out ACS

## 2021-03-02 NOTE — ED ADULT TRIAGE NOTE - CHIEF COMPLAINT QUOTE
pt BIBA of generalized weakness and fatigue since this am. fs 170 in field. history of htn, high cholesterol and asthma

## 2021-03-02 NOTE — ED PROVIDER NOTE - CARE PLAN
Principal Discharge DX:	Elevated troponin   Principal Discharge DX:	Elevated troponin  Secondary Diagnosis:	Near syncope

## 2021-03-02 NOTE — H&P ADULT - NSHPPHYSICALEXAM_GEN_ALL_CORE
Pt interviewed on telemedicine video conference PHYSICAL EXAMINATION:  Vital Signs Last 24 Hrs  T(C): 37 (02 Mar 2021 12:39), Max: 37 (02 Mar 2021 12:39)  T(F): 98.6 (02 Mar 2021 12:39), Max: 98.6 (02 Mar 2021 12:39)  HR: 54 (02 Mar 2021 12:39) (54 - 54)  BP: 112/79 (02 Mar 2021 12:39) (112/79 - 112/79)  BP(mean): --  RR: 18 (02 Mar 2021 12:39) (18 - 18)  SpO2: 100% (02 Mar 2021 12:39) (100% - 100%)  CAPILLARY BLOOD GLUCOSE          GENERAL: NAD, well-groomed, well-developed, seen in ER, comfortable, No CP or SOB  HEAD:  atraumatic, normocephalic  EYES: sclera anicteric  ENMT: mucous membranes moist  NECK: supple, No JVD  CHEST/LUNG: clear to auscultation bilaterally; no rales, rhonchi, or wheezing b/l  HEART: normal S1, S2  ABDOMEN: BS+, soft, ND, NT   EXTREMITIES:  pulses palpable; no clubbing, cyanosis, or edema b/l LEs  NEURO: awake, alert, interactive; moves all extremities  SKIN: no rashes or lesions

## 2021-03-03 LAB
A1C WITH ESTIMATED AVERAGE GLUCOSE RESULT: 6.1 % — HIGH (ref 4–5.6)
ANION GAP SERPL CALC-SCNC: 6 MMOL/L — SIGNIFICANT CHANGE UP (ref 5–17)
BUN SERPL-MCNC: 13 MG/DL — SIGNIFICANT CHANGE UP (ref 7–23)
CALCIUM SERPL-MCNC: 8.7 MG/DL — SIGNIFICANT CHANGE UP (ref 8.5–10.1)
CHLORIDE SERPL-SCNC: 108 MMOL/L — SIGNIFICANT CHANGE UP (ref 96–108)
CHOLEST SERPL-MCNC: 195 MG/DL — SIGNIFICANT CHANGE UP
CO2 SERPL-SCNC: 26 MMOL/L — SIGNIFICANT CHANGE UP (ref 22–31)
CREAT SERPL-MCNC: 0.71 MG/DL — SIGNIFICANT CHANGE UP (ref 0.5–1.3)
ESTIMATED AVERAGE GLUCOSE: 128 MG/DL — HIGH (ref 68–114)
GLUCOSE SERPL-MCNC: 98 MG/DL — SIGNIFICANT CHANGE UP (ref 70–99)
HCT VFR BLD CALC: 35.4 % — SIGNIFICANT CHANGE UP (ref 34.5–45)
HDLC SERPL-MCNC: 41 MG/DL — LOW
HGB BLD-MCNC: 11.5 G/DL — SIGNIFICANT CHANGE UP (ref 11.5–15.5)
LIPID PNL WITH DIRECT LDL SERPL: 138 MG/DL — HIGH
MAGNESIUM SERPL-MCNC: 2.4 MG/DL — SIGNIFICANT CHANGE UP (ref 1.6–2.6)
MCHC RBC-ENTMCNC: 25.1 PG — LOW (ref 27–34)
MCHC RBC-ENTMCNC: 32.5 GM/DL — SIGNIFICANT CHANGE UP (ref 32–36)
MCV RBC AUTO: 77.1 FL — LOW (ref 80–100)
NON HDL CHOLESTEROL: 154 MG/DL — HIGH
NRBC # BLD: 0 /100 WBCS — SIGNIFICANT CHANGE UP (ref 0–0)
PLATELET # BLD AUTO: 212 K/UL — SIGNIFICANT CHANGE UP (ref 150–400)
POTASSIUM SERPL-MCNC: 3.5 MMOL/L — SIGNIFICANT CHANGE UP (ref 3.5–5.3)
POTASSIUM SERPL-SCNC: 3.5 MMOL/L — SIGNIFICANT CHANGE UP (ref 3.5–5.3)
RBC # BLD: 4.59 M/UL — SIGNIFICANT CHANGE UP (ref 3.8–5.2)
RBC # FLD: 14.4 % — SIGNIFICANT CHANGE UP (ref 10.3–14.5)
SARS-COV-2 IGG SERPL QL IA: NEGATIVE — SIGNIFICANT CHANGE UP
SARS-COV-2 IGM SERPL IA-ACNC: 0.08 INDEX — SIGNIFICANT CHANGE UP
SODIUM SERPL-SCNC: 140 MMOL/L — SIGNIFICANT CHANGE UP (ref 135–145)
TRIGL SERPL-MCNC: 81 MG/DL — SIGNIFICANT CHANGE UP
TSH SERPL-MCNC: 0.53 UIU/ML — SIGNIFICANT CHANGE UP (ref 0.36–3.74)
WBC # BLD: 5.21 K/UL — SIGNIFICANT CHANGE UP (ref 3.8–10.5)
WBC # FLD AUTO: 5.21 K/UL — SIGNIFICANT CHANGE UP (ref 3.8–10.5)

## 2021-03-03 PROCEDURE — 93306 TTE W/DOPPLER COMPLETE: CPT | Mod: 26

## 2021-03-03 PROCEDURE — 99233 SBSQ HOSP IP/OBS HIGH 50: CPT

## 2021-03-03 RX ORDER — NIFEDIPINE 30 MG
0 TABLET, EXTENDED RELEASE 24 HR ORAL
Qty: 0 | Refills: 0 | DISCHARGE

## 2021-03-03 RX ORDER — LANOLIN ALCOHOL/MO/W.PET/CERES
3 CREAM (GRAM) TOPICAL AT BEDTIME
Refills: 0 | Status: DISCONTINUED | OUTPATIENT
Start: 2021-03-03 | End: 2021-03-05

## 2021-03-03 RX ORDER — MONTELUKAST 4 MG/1
1 TABLET, CHEWABLE ORAL
Qty: 0 | Refills: 0 | DISCHARGE

## 2021-03-03 RX ORDER — ERGOCALCIFEROL 1.25 MG/1
1 CAPSULE ORAL
Qty: 0 | Refills: 0 | DISCHARGE

## 2021-03-03 RX ORDER — GABAPENTIN 400 MG/1
0 CAPSULE ORAL
Qty: 0 | Refills: 1 | DISCHARGE

## 2021-03-03 RX ORDER — ALBUTEROL 90 UG/1
2 AEROSOL, METERED ORAL
Qty: 0 | Refills: 0 | DISCHARGE

## 2021-03-03 RX ORDER — METOPROLOL TARTRATE 50 MG
0 TABLET ORAL
Qty: 0 | Refills: 1 | DISCHARGE

## 2021-03-03 RX ORDER — METOPROLOL TARTRATE 50 MG
1 TABLET ORAL
Qty: 0 | Refills: 1 | DISCHARGE

## 2021-03-03 RX ORDER — NIFEDIPINE 30 MG
1 TABLET, EXTENDED RELEASE 24 HR ORAL
Qty: 0 | Refills: 0 | DISCHARGE

## 2021-03-03 RX ORDER — CLOPIDOGREL BISULFATE 75 MG/1
1 TABLET, FILM COATED ORAL
Qty: 30 | Refills: 0

## 2021-03-03 RX ORDER — GABAPENTIN 400 MG/1
1 CAPSULE ORAL
Qty: 0 | Refills: 1 | DISCHARGE

## 2021-03-03 RX ADMIN — HEPARIN SODIUM 5000 UNIT(S): 5000 INJECTION INTRAVENOUS; SUBCUTANEOUS at 17:31

## 2021-03-03 RX ADMIN — MONTELUKAST 10 MILLIGRAM(S): 4 TABLET, CHEWABLE ORAL at 12:04

## 2021-03-03 RX ADMIN — Medication 3 MILLIGRAM(S): at 21:34

## 2021-03-03 RX ADMIN — CLOPIDOGREL BISULFATE 75 MILLIGRAM(S): 75 TABLET, FILM COATED ORAL at 12:04

## 2021-03-03 RX ADMIN — Medication 25 MILLIGRAM(S): at 12:04

## 2021-03-03 RX ADMIN — GABAPENTIN 300 MILLIGRAM(S): 400 CAPSULE ORAL at 12:04

## 2021-03-03 RX ADMIN — Medication 60 MILLIGRAM(S): at 05:16

## 2021-03-03 RX ADMIN — HEPARIN SODIUM 5000 UNIT(S): 5000 INJECTION INTRAVENOUS; SUBCUTANEOUS at 05:14

## 2021-03-03 NOTE — PROGRESS NOTE ADULT - ASSESSMENT
65 year old female w/PMH of CAD, HLD, HTN, asthma presents to the ED for nausea, dizziness, lightheadedness and near syncopal episode after taking isosorbide this morning.  Per pt this is a new medication since October and has had nausea with it before. Pt feels her symptoms were related to her medication. Denies fever/chills, cough, SOB, palpitations or V/D. Pt currently asymptomatic. In ED trop is 0.049. Per pt earlier today while waiting in ED did have a brief episode of substernal chest pain with radiation to left shoulder pressure like but has resolved on its own.    Near syncope:  - Positive orthostasis  - Continue to hold nitro  - Trop trending down  - Follow up 2D echo  - On BB, CCB    H/O CAD:  - Trop trending down  - Check 2D echo  - Continue Plavix and metoprolol     Had stress test here October 2020, normal study.       HTN:  - Pos orthostasis  - On nifedipine and metoprolol  - Hold Imdur    Asthma:  - Stable, continue singular    Neuropathy:  - on gabapentin

## 2021-03-04 LAB
CULTURE RESULTS: SIGNIFICANT CHANGE UP
SPECIMEN SOURCE: SIGNIFICANT CHANGE UP

## 2021-03-04 PROCEDURE — 99233 SBSQ HOSP IP/OBS HIGH 50: CPT

## 2021-03-04 RX ORDER — ATORVASTATIN CALCIUM 80 MG/1
40 TABLET, FILM COATED ORAL AT BEDTIME
Refills: 0 | Status: DISCONTINUED | OUTPATIENT
Start: 2021-03-04 | End: 2021-03-05

## 2021-03-04 RX ADMIN — ATORVASTATIN CALCIUM 40 MILLIGRAM(S): 80 TABLET, FILM COATED ORAL at 21:46

## 2021-03-04 RX ADMIN — HEPARIN SODIUM 5000 UNIT(S): 5000 INJECTION INTRAVENOUS; SUBCUTANEOUS at 05:32

## 2021-03-04 RX ADMIN — MONTELUKAST 10 MILLIGRAM(S): 4 TABLET, CHEWABLE ORAL at 12:05

## 2021-03-04 RX ADMIN — HEPARIN SODIUM 5000 UNIT(S): 5000 INJECTION INTRAVENOUS; SUBCUTANEOUS at 16:26

## 2021-03-04 RX ADMIN — CLOPIDOGREL BISULFATE 75 MILLIGRAM(S): 75 TABLET, FILM COATED ORAL at 12:05

## 2021-03-04 RX ADMIN — Medication 3 MILLIGRAM(S): at 21:46

## 2021-03-04 RX ADMIN — Medication 60 MILLIGRAM(S): at 05:32

## 2021-03-04 RX ADMIN — GABAPENTIN 300 MILLIGRAM(S): 400 CAPSULE ORAL at 12:05

## 2021-03-04 NOTE — PROGRESS NOTE ADULT - ASSESSMENT
65 year old female w/PMH of CAD, HLD, HTN, asthma presents to the ED for nausea, dizziness, lightheadedness and near syncopal episode after taking isosorbide this morning.  Per pt this is a new medication since October and has had nausea with it before. Pt feels her symptoms were related to her medication. Denies fever/chills, cough, SOB, palpitations or V/D. Pt currently asymptomatic. In ED trop is 0.049. Per pt earlier today while waiting in ED did have a brief episode of substernal chest pain with radiation to left shoulder pressure like but has resolved on its own.    Near syncope:  - Positive orthostasis  - Continue to hold nitro  - Trop trending down  - 2D echo with no acute finding  - On BB, CCB  - Repeat orthostasis in am    H/O CAD:  - Trop trending down  - 2D echo with preserved EF  - Continue Plavix and metoprolol, start statin  - Had stress test here October 2020, normal study.     - Cardio consulted    HTN:  - Pos orthostasis  - On nifedipine and metoprolol  - Hold Imdur    Asthma:  - Stable, continue singular    Neuropathy:  - on gabapentin

## 2021-03-04 NOTE — PROGRESS NOTE ADULT - SUBJECTIVE AND OBJECTIVE BOX
Patient is a 65y old  Female who presents with a chief complaint of Near syncope/chest pain (03 Mar 2021 18:01)    INTERVAL HPI/OVERNIGHT EVENTS:  Pt was seen and examined, no acute events.    MEDICATIONS  (STANDING):  atorvastatin 40 milliGRAM(s) Oral at bedtime  clopidogrel Tablet 75 milliGRAM(s) Oral daily  gabapentin 300 milliGRAM(s) Oral daily  heparin   Injectable 5000 Unit(s) SubCutaneous every 12 hours  influenza   Vaccine 0.5 milliLiter(s) IntraMuscular once  melatonin 3 milliGRAM(s) Oral at bedtime  metoprolol succinate ER 25 milliGRAM(s) Oral daily  montelukast 10 milliGRAM(s) Oral daily  NIFEdipine XL 60 milliGRAM(s) Oral daily    MEDICATIONS  (PRN):      Allergies  aspirin (Angioedema)  penicillin (Rash)        Vital Signs Last 24 Hrs  T(C): 37.2 (04 Mar 2021 17:10), Max: 37.2 (04 Mar 2021 17:10)  T(F): 99 (04 Mar 2021 17:10), Max: 99 (04 Mar 2021 17:10)  HR: 63 (04 Mar 2021 17:10) (50 - 63)  BP: 131/67 (04 Mar 2021 17:10) (106/69 - 131/67)  BP(mean): --  RR: 18 (04 Mar 2021 17:10) (18 - 20)  SpO2: 99% (04 Mar 2021 17:10) (97% - 99%)      PHYSICAL EXAM:  GENERAL: NAD  HEAD:  Atraumatic  EYES: PERRLA  NERVOUS SYSTEM:  Awake, alert  CHEST/LUNG: Clear  HEART: RRR  ABDOMEN: Soft,  non tender  EXTREMITIES:  no edema      LABS:                        11.5   5.21  )-----------( 212      ( 03 Mar 2021 06:47 )             35.4     03-03    140  |  108  |  13  ----------------------------<  98  3.5   |  26  |  0.71    Ca    8.7      03 Mar 2021 06:47  Mg     2.4     03-03      CAPILLARY BLOOD GLUCOSE      Culture - Urine (collected 03 Mar 2021 00:28)  Source: .Urine Clean Catch (Midstream)  Final Report (04 Mar 2021 00:31):    <10,000 CFU/mL Normal Urogenital Shital      RADIOLOGY & ADDITIONAL TESTS:    Imaging Personally Reviewed:  [ ] YES  [ ] NO    Consultant(s) Notes Reviewed:  [ ] YES  [ ] NO    Care Discussed with Consultants/Other Providers [ ] YES  [ ] NO
Patient is a 65y old  Female who presents with a chief complaint of Near syncope/chest pain (02 Mar 2021 17:45)    INTERVAL HPI/OVERNIGHT EVENTS:  Pt was seen and examined, no acute events.    MEDICATIONS  (STANDING):  clopidogrel Tablet 75 milliGRAM(s) Oral daily  gabapentin 300 milliGRAM(s) Oral daily  heparin   Injectable 5000 Unit(s) SubCutaneous every 12 hours  influenza   Vaccine 0.5 milliLiter(s) IntraMuscular once  metoprolol succinate ER 25 milliGRAM(s) Oral daily  montelukast 10 milliGRAM(s) Oral daily  NIFEdipine XL 60 milliGRAM(s) Oral daily    MEDICATIONS  (PRN):      Allergies  aspirin (Angioedema)  penicillin (Rash)        Vital Signs Last 24 Hrs  T(C): 37 (03 Mar 2021 17:12), Max: 37 (03 Mar 2021 17:12)  T(F): 98.6 (03 Mar 2021 17:12), Max: 98.6 (03 Mar 2021 17:12)  HR: 66 (03 Mar 2021 17:12) (52 - 66)  BP: 138/81 (03 Mar 2021 17:12) (118/62 - 154/67)  BP(mean): --  RR: 18 (03 Mar 2021 17:12) (12 - 18)  SpO2: 98% (03 Mar 2021 17:12) (98% - 100%)      PHYSICAL EXAM:  GENERAL: NAD  HEAD:  Atraumatic  EYES: PERRLA  NERVOUS SYSTEM:  Awake, alert  CHEST/LUNG: Clear  HEART: RRR  ABDOMEN: Soft, non tender  EXTREMITIES:  no edema      LABS:                        11.5   5.21  )-----------( 212      ( 03 Mar 2021 06:47 )             35.4     03-03    140  |  108  |  13  ----------------------------<  98  3.5   |  26  |  0.71    Ca    8.7      03 Mar 2021 06:47  Mg     2.4     03-03    TPro  7.7  /  Alb  4.1  /  TBili  0.7  /  DBili  x   /  AST  13<L>  /  ALT  19  /  AlkPhos  77  03-02      Urinalysis Basic - ( 02 Mar 2021 17:06 )    Color: Yellow / Appearance: Clear / S.005 / pH: x  Gluc: x / Ketone: Negative  / Bili: Negative / Urobili: Negative mg/dL   Blood: x / Protein: 30 mg/dL / Nitrite: Negative   Leuk Esterase: Negative / RBC: x / WBC 0-2   Sq Epi: x / Non Sq Epi: Occasional / Bacteria: Few      CAPILLARY BLOOD GLUCOSE          RADIOLOGY & ADDITIONAL TESTS:    Imaging Personally Reviewed:  [ ] YES  [ ] NO    Consultant(s) Notes Reviewed:  [ ] YES  [ ] NO    Care Discussed with Consultants/Other Providers [ ] YES  [ ] NO

## 2021-03-05 VITALS
RESPIRATION RATE: 18 BRPM | SYSTOLIC BLOOD PRESSURE: 116 MMHG | DIASTOLIC BLOOD PRESSURE: 59 MMHG | HEART RATE: 57 BPM | OXYGEN SATURATION: 99 % | TEMPERATURE: 98 F

## 2021-03-05 PROCEDURE — 99222 1ST HOSP IP/OBS MODERATE 55: CPT

## 2021-03-05 PROCEDURE — 99239 HOSP IP/OBS DSCHRG MGMT >30: CPT

## 2021-03-05 RX ORDER — ISOSORBIDE MONONITRATE 60 MG/1
1 TABLET, EXTENDED RELEASE ORAL
Qty: 0 | Refills: 0 | DISCHARGE

## 2021-03-05 RX ORDER — ATORVASTATIN CALCIUM 80 MG/1
1 TABLET, FILM COATED ORAL
Qty: 30 | Refills: 0
Start: 2021-03-05 | End: 2021-04-03

## 2021-03-05 RX ADMIN — HEPARIN SODIUM 5000 UNIT(S): 5000 INJECTION INTRAVENOUS; SUBCUTANEOUS at 05:01

## 2021-03-05 RX ADMIN — Medication 25 MILLIGRAM(S): at 10:22

## 2021-03-05 RX ADMIN — MONTELUKAST 10 MILLIGRAM(S): 4 TABLET, CHEWABLE ORAL at 11:29

## 2021-03-05 RX ADMIN — HEPARIN SODIUM 5000 UNIT(S): 5000 INJECTION INTRAVENOUS; SUBCUTANEOUS at 17:24

## 2021-03-05 RX ADMIN — GABAPENTIN 300 MILLIGRAM(S): 400 CAPSULE ORAL at 11:33

## 2021-03-05 RX ADMIN — CLOPIDOGREL BISULFATE 75 MILLIGRAM(S): 75 TABLET, FILM COATED ORAL at 11:31

## 2021-03-05 RX ADMIN — Medication 60 MILLIGRAM(S): at 05:01

## 2021-03-05 NOTE — DISCHARGE NOTE PROVIDER - NSDCMRMEDTOKEN_GEN_ALL_CORE_FT
atorvastatin 40 mg oral tablet: 1 tab(s) orally once a day (at bedtime)  gabapentin 300 mg oral capsule: 1 cap(s) orally once a day  METOPROLOL ER SUCCINATE 25MG TABS: 1 tab(s) orally once a day  NIFEDIPINE 60MG ER (CC) TABLETS: 1 tab(s) orally once a day  Plavix 75 mg oral tablet: 1 tab(s) orally once a day   Singulair 10 mg oral tablet: 1 tab(s) orally once a day  Ventolin HFA 90 mcg/inh inhalation aerosol: 2 puff(s) inhaled every 6 hours, As Needed  Vitamin D2 50,000 intl units (1.25 mg) oral capsule: 1 cap(s) orally once a week

## 2021-03-05 NOTE — DISCHARGE NOTE PROVIDER - HOSPITAL COURSE
65 year old female w/PMH of CAD, HLD, HTN, asthma presents to the ED for nausea, dizziness, lightheadedness and near syncopal episode after taking isosorbide this morning.  Per pt this is a new medication since October and has had nausea with it before. Pt feels her symptoms were related to her medication. Denies fever/chills, cough, SOB, palpitations or V/D. Pt currently asymptomatic. In ED trop is 0.049. Per pt earlier today while waiting in ED did have a brief episode of substernal chest pain with radiation to left shoulder pressure like but has resolved on its own.    Near syncope:  - Positive orthostasis  - Continue to hold nitro  - Trop trending down  - 2D echo with no acute finding  - On BB, CCB    H/O CAD:  - Trop trending down  - 2D echo with preserved EF  - Continue Plavix and metoprolol, start statin  - Had stress test here October 2020, normal study.     - Evaluated by cardio     HTN:  - Pos orthostasis  - On nifedipine and metoprolol  - Hold Imdur    Asthma:  - Stable, continue singular    Neuropathy:  - on gabapentin

## 2021-03-05 NOTE — DISCHARGE NOTE NURSING/CASE MANAGEMENT/SOCIAL WORK - PATIENT PORTAL LINK FT
You can access the FollowMyHealth Patient Portal offered by [facility] by registering at the following website: [email or web address]. By joining [facility]’s FollowMyHealth portal, you will also be able to view your health information using other applications (apps) compatible with our system.

## 2021-03-05 NOTE — DISCHARGE NOTE PROVIDER - NSDCCPCAREPLAN_GEN_ALL_CORE_FT
PRINCIPAL DISCHARGE DIAGNOSIS  Diagnosis: Elevated troponin  Assessment and Plan of Treatment: - Trop trending down  - 2D echo with preserved EF  - Continue Plavix and metoprolol, start statin  - Had stress test here October 2020, normal study.     - Evaluated by cardio      SECONDARY DISCHARGE DIAGNOSES  Diagnosis: Near syncope  Assessment and Plan of Treatment: - Positive orthostasis  - Continue to hold nitro  - Trop trending down  - 2D echo with no acute finding  - On BB, CCB  Echo:   1. Left ventricular ejection fraction, by visual estimation, is 60 to 65%.   2. Technically fair study.   3. Normal global left ventricular systolic function.   4. Normal left ventricular internal cavity size.   5. Normal left ventricular size and wall thicknesses, with normal systolic and diastolic function.   6. Normal right ventricular size and function.   7. Normal left atrial size.   8. Normal right atrial size.   9. There is no evidence of pericardial effusion.  10. No evidence of mitral valve regurgitation.  11. Structurally normal mitral valve, with normal leaflet excursion.  12. Normal trileaflet aortic valve with normal opening.    Diagnosis: Prediabetes  Assessment and Plan of Treatment: Hb A1c 6.1  Diet control, weight loss    Diagnosis: HLD (hyperlipidemia)  Assessment and Plan of Treatment: Start lipitor

## 2021-03-05 NOTE — CONSULT NOTE ADULT - SUBJECTIVE AND OBJECTIVE BOX
CHIEF COMPLAINT:  Patient is a 65y old  Female who presents with a chief complaint of Near syncope/chest pain (04 Mar 2021 20:32)      HPI:  65 year old female CAD, HLD, HTN, asthma presents to the ED for nausea, dizziness, lightheadedness and near syncopal episode after taking isosorbide.  Per pt this is a new medication since October and has had nausea with it before. Pt feels her symptoms were related to her medication. Denies fever/chills, cough, SOB, palpitations or V/D. Pt currently asymptomatic. In ED trop is 0.049. Per pt earlier today while waiting in ED did have a brief episode of substernal chest pain with radiation to left shoulder pressure like but has resolved on its own.     Pt admitted for near syncope and chest pain eval. Had stress test here October 2020 which was a normal study.    ALLERGIES:  aspirin (Angioedema)  penicillin (Rash)      Home Medications:  gabapentin 300 mg oral capsule: 1 cap(s) orally once a day (03 Mar 2021 14:06)  isosorbide mononitrate 30 mg oral tablet, extended release: 1 tab(s) orally once a day (in the morning) (03 Mar 2021 14:06)  METOPROLOL ER SUCCINATE 25MG TABS: 1 tab(s) orally once a day (03 Mar 2021 14:06)  NIFEDIPINE 60MG ER (CC) TABLETS: 1 tab(s) orally once a day (03 Mar 2021 14:06)  Singulair 10 mg oral tablet: 1 tab(s) orally once a day (03 Mar 2021 14:06)  Ventolin HFA 90 mcg/inh inhalation aerosol: 2 puff(s) inhaled every 6 hours, As Needed (03 Mar 2021 14:06)  Vitamin D2 50,000 intl units (1.25 mg) oral capsule: 1 cap(s) orally once a week (03 Mar 2021 14:06)    PAST MEDICAL & SURGICAL HISTORY:  Hypertension    CAD (coronary artery disease)    History of lumpectomy          FAMILY HISTORY:  No pertinent family history in first degree relatives        SOCIAL HISTORY:    REVIEW OF SYSTEMS:  General:  No wt loss, fevers, chills, night sweats  Eyes:  Good vision, no reported pain  ENT:  No sore throat, pain, runny nose, dysphagia  CV:  No pain, palpitations, hypo/hypertension  Resp:  No dyspnea, cough, tachypnea, wheezing  GI:  No pain, nausea, vomiting, diarrhea, constipation  :  No pain, bleeding, incontinence, nocturia  Muscle:  No pain, weakness  Breast:  No pain, abscess, mass, discharge  Neuro:  No weakness, tingling, memory problems  Psych:  No fatigue, insomnia, mood problems, depression  Endocrine:  No polyuria, polydipsia, cold/heat intolerance  Heme:  No petechiae, ecchymosis, easy bruisability  Skin:  No rash, edema    PHYSICAL EXAM:  Vital Signs:  Vital Signs Last 24 Hrs  T(C): 36.7 (05 Mar 2021 10:40), Max: 37.2 (04 Mar 2021 17:10)  T(F): 98 (05 Mar 2021 10:40), Max: 99 (04 Mar 2021 17:10)  HR: 60 (05 Mar 2021 10:40) (57 - 63)  BP: 116/73 (05 Mar 2021 10:40) (116/73 - 143/83)  RR: 19 (05 Mar 2021 10:40) (18 - 19)  SpO2: 98% (05 Mar 2021 10:40) (98% - 100%)  I&O's Summary    04 Mar 2021 07:01  -  05 Mar 2021 07:00  --------------------------------------------------------  IN: 480 mL / OUT: 0 mL / NET: 480 mL      I&O's Detail    04 Mar 2021 07:01  -  05 Mar 2021 07:00  --------------------------------------------------------  IN:    Oral Fluid: 480 mL  Total IN: 480 mL    OUT:  Total OUT: 0 mL    Total NET: 480 mL      Tele:     Constitutional: well developed, normal appearance, well groomed, well nourished, no deformities and no acute distress.   Eyes: the conjunctiva exhibited no abnormalities and the eyelids demonstrated no xanthelasmas.   HEENT: normal oral mucosa, no oral pallor and no oral cyanosis.   Neck: normal jugular venous A waves present, normal jugular venous V waves present and no jugular venous felipe A waves.   Pulmonary: no respiratory distress, normal respiratory rhythm and effort, no accessory muscle use and lungs were clear to auscultation bilaterally.   Cardiovascular: heart rate and rhythm were normal, normal S1 and S2 and no murmur, gallop, rub, heave or thrill are present.   Abdomen: soft, non-tender, no hepato-splenomegaly and no abdominal mass palpated.   Musculoskeletal: the gait could not be assessed..   Extremities: no clubbing of the fingernails, no localized cyanosis, no petechial hemorrhages and no ischemic changes.   Skin: normal skin color and pigmentation, no rash, no venous stasis, no skin lesions, no skin ulcer and no xanthoma was observed.   Psychiatric: oriented to person, place, and time, the affect was normal, the mood was normal and not feeling anxious.      LABORATORY:  Complete Blood Count in AM (03.03.21 @ 06:47)    Nucleated RBC: 0 /100 WBCs    WBC Count: 5.21 K/uL    RBC Count: 4.59 M/uL    Hemoglobin: 11.5 g/dL    Hematocrit: 35.4 %    Mean Cell Volume: 77.1 fl    Mean Cell Hemoglobin: 25.1 pg    Mean Cell Hemoglobin Conc: 32.5 gm/dL    Red Cell Distrib Width: 14.4 %    Platelet Count - Automated: 212 K/uL    Lipid Profile in AM (03.03.21 @ 11:23)    Cholesterol, Serum: 195 mg/dL    Triglycerides, Serum: 81 mg/dL    HDL Cholesterol, Serum: 41 mg/dL    Non HDL Cholesterol: 154      Thyroid Stimulating Hormone, Serum: 0.526 uIU/mL (03.03.21 @ 06:47)    Troponin I, Serum: .110        IMAGING:  < from: 12 Lead ECG (03.02.21 @ 14:52) >  Sinus bradycardia  Minimal voltage criteria for LVH, may be normal variant  Borderline ECG  When compared with ECG of 27-OCT-2020 06:03,  No significant change was found    < end of copied text >    < from: TTE Echo Complete w/o Contrast w/ Doppler (03.03.21 @ 09:17) >  Summary:   1. Left ventricular ejection fraction, by visual estimation, is 60 to 65%.   2.Technically fair study.   3. Normal global left ventricular systolic function.   4. Normal left ventricular internal cavity size.   5. Normal left ventricular size and wall thicknesses, with normal systolic and diastolic function.   6. Normal right ventricular size and function.   7. Normal left atrial size.   8. Normal right atrial size.   9. There is no evidence of pericardial effusion.  10. No evidence of mitral valve regurgitation.  11. Structurally normal mitral valve, with normal leaflet excursion.  12. Normal trileaflet aortic valve with normal opening.    < end of copied text >    ASSESSMENT:  65 year old female CAD, HLD, HTN, asthma presents to the ED for nausea, dizziness, lightheadedness and near syncopal episode after taking isosorbide.  Per pt this is a new medication since October and has had nausea with it before. Pt feels her symptoms were related to her medication. Denies fever/chills, cough, SOB, palpitations or V/D. Pt currently asymptomatic. In ED trop is 0.049. Per pt earlier today while waiting in ED did have a brief episode of substernal chest pain with radiation to left shoulder pressure like but has resolved on its own.     Pt admitted for near syncope and chest pain eval. Had stress test here October 2020 which was a normal study.    PLAN:       atorvastatin 40 milliGRAM(s) Oral at bedtime  clopidogrel Tablet 75 milliGRAM(s) Oral daily  gabapentin 300 milliGRAM(s) Oral daily  heparin   Injectable 5000 Unit(s) SubCutaneous every 12 hours  influenza   Vaccine 0.5 milliLiter(s) IntraMuscular once  melatonin 3 milliGRAM(s) Oral at bedtime  metoprolol succinate ER 25 milliGRAM(s) Oral daily  montelukast 10 milliGRAM(s) Oral daily  NIFEdipine XL 60 milliGRAM(s) Oral daily      Ben Peterson MD, FACC, FASE, FASNC, FACP  Director, Heart Failure Services  [facility]  , Department of Cardiology  [facility]     CHIEF COMPLAINT:  Patient is a 65y old  Female who presents with a chief complaint of Near syncope/chest pain (04 Mar 2021 20:32)      HPI:  65 year old female CAD, HLD, HTN, asthma presents to the ED for nausea, dizziness, lightheadedness and near syncopal episode after taking isosorbide.  Per pt this is a new medication since October and has had nausea with it before. Pt feels her symptoms were related to her medication. Denies fever/chills, cough, SOB, palpitations or V/D. Pt currently asymptomatic. In ED trop is 0.049. Per pt earlier today while waiting in ED did have a brief episode of substernal chest pain with radiation to left shoulder pressure like but has resolved on its own.     Pt admitted for near syncope and chest pain eval. Had stress test here October 2020 which was a normal study.    ALLERGIES:  aspirin (Angioedema)  penicillin (Rash)      Home Medications:  gabapentin 300 mg oral capsule: 1 cap(s) orally once a day (03 Mar 2021 14:06)  isosorbide mononitrate 30 mg oral tablet, extended release: 1 tab(s) orally once a day (in the morning) (03 Mar 2021 14:06)  METOPROLOL ER SUCCINATE 25MG TABS: 1 tab(s) orally once a day (03 Mar 2021 14:06)  NIFEDIPINE 60MG ER (CC) TABLETS: 1 tab(s) orally once a day (03 Mar 2021 14:06)  Singulair 10 mg oral tablet: 1 tab(s) orally once a day (03 Mar 2021 14:06)  Ventolin HFA 90 mcg/inh inhalation aerosol: 2 puff(s) inhaled every 6 hours, As Needed (03 Mar 2021 14:06)  Vitamin D2 50,000 intl units (1.25 mg) oral capsule: 1 cap(s) orally once a week (03 Mar 2021 14:06)    PAST MEDICAL & SURGICAL HISTORY:  Hypertension    CAD (coronary artery disease)    History of lumpectomy          FAMILY HISTORY:  No pertinent family history in first degree relatives        SOCIAL HISTORY:    REVIEW OF SYSTEMS:  General:  No wt loss, fevers, chills, night sweats  Eyes:  Good vision, no reported pain  ENT:  No sore throat, pain, runny nose, dysphagia  CV:  No pain, palpitations, hypo/hypertension  Resp:  No dyspnea, cough, tachypnea, wheezing  GI:  No pain, nausea, vomiting, diarrhea, constipation  :  No pain, bleeding, incontinence, nocturia  Muscle:  No pain, weakness  Breast:  No pain, abscess, mass, discharge  Neuro:  No weakness, tingling, memory problems  Psych:  No fatigue, insomnia, mood problems, depression  Endocrine:  No polyuria, polydipsia, cold/heat intolerance  Heme:  No petechiae, ecchymosis, easy bruisability  Skin:  No rash, edema    PHYSICAL EXAM:  Vital Signs:  Vital Signs Last 24 Hrs  T(C): 36.7 (05 Mar 2021 10:40), Max: 37.2 (04 Mar 2021 17:10)  T(F): 98 (05 Mar 2021 10:40), Max: 99 (04 Mar 2021 17:10)  HR: 60 (05 Mar 2021 10:40) (57 - 63)  BP: 116/73 (05 Mar 2021 10:40) (116/73 - 143/83)  RR: 19 (05 Mar 2021 10:40) (18 - 19)  SpO2: 98% (05 Mar 2021 10:40) (98% - 100%)  I&O's Summary    04 Mar 2021 07:01  -  05 Mar 2021 07:00  --------------------------------------------------------  IN: 480 mL / OUT: 0 mL / NET: 480 mL      I&O's Detail    04 Mar 2021 07:01  -  05 Mar 2021 07:00  --------------------------------------------------------  IN:    Oral Fluid: 480 mL  Total IN: 480 mL    OUT:  Total OUT: 0 mL    Total NET: 480 mL      Tele: off    Constitutional: well developed, normal appearance, well groomed, well nourished, no deformities and no acute distress.   Eyes: the conjunctiva exhibited no abnormalities and the eyelids demonstrated no xanthelasmas.   HEENT: normal oral mucosa, no oral pallor and no oral cyanosis.   Neck: normal jugular venous A waves present, normal jugular venous V waves present and no jugular venous felipe A waves.   Pulmonary: no respiratory distress, normal respiratory rhythm and effort, no accessory muscle use and lungs were clear to auscultation bilaterally.   Cardiovascular: heart rate and rhythm were normal, normal S1 and S2 and no murmur, gallop, rub, heave or thrill are present.   Abdomen: soft, non-tender, no hepato-splenomegaly and no abdominal mass palpated.   Musculoskeletal: the gait could not be assessed..   Extremities: no clubbing of the fingernails, no localized cyanosis, no petechial hemorrhages and no ischemic changes.   Skin: normal skin color and pigmentation, no rash, no venous stasis, no skin lesions, no skin ulcer and no xanthoma was observed.   Psychiatric: oriented to person, place, and time, the affect was normal, the mood was normal and not feeling anxious.      LABORATORY:  Complete Blood Count in AM (03.03.21 @ 06:47)    Nucleated RBC: 0 /100 WBCs    WBC Count: 5.21 K/uL    RBC Count: 4.59 M/uL    Hemoglobin: 11.5 g/dL    Hematocrit: 35.4 %    Mean Cell Volume: 77.1 fl    Mean Cell Hemoglobin: 25.1 pg    Mean Cell Hemoglobin Conc: 32.5 gm/dL    Red Cell Distrib Width: 14.4 %    Platelet Count - Automated: 212 K/uL    Lipid Profile in AM (03.03.21 @ 11:23)    Cholesterol, Serum: 195 mg/dL    Triglycerides, Serum: 81 mg/dL    HDL Cholesterol, Serum: 41 mg/dL    Non HDL Cholesterol: 154      Thyroid Stimulating Hormone, Serum: 0.526 uIU/mL (03.03.21 @ 06:47)    Troponin I, Serum: .110        IMAGING:  < from: 12 Lead ECG (03.02.21 @ 14:52) >  Sinus bradycardia  Minimal voltage criteria for LVH, may be normal variant  Borderline ECG  When compared with ECG of 27-OCT-2020 06:03,  No significant change was found    < end of copied text >    < from: TTE Echo Complete w/o Contrast w/ Doppler (03.03.21 @ 09:17) >  Summary:   1. Left ventricular ejection fraction, by visual estimation, is 60 to 65%.   2.Technically fair study.   3. Normal global left ventricular systolic function.   4. Normal left ventricular internal cavity size.   5. Normal left ventricular size and wall thicknesses, with normal systolic and diastolic function.   6. Normal right ventricular size and function.   7. Normal left atrial size.   8. Normal right atrial size.   9. There is no evidence of pericardial effusion.  10. No evidence of mitral valve regurgitation.  11. Structurally normal mitral valve, with normal leaflet excursion.  12. Normal trileaflet aortic valve with normal opening.    < end of copied text >    ASSESSMENT:  65 year old female CAD, HLD, HTN, asthma presents to the ED for nausea, dizziness, lightheadedness and near syncopal episode after taking isosorbide.  Per pt this is a new medication since October and has had nausea with it before. Pt feels her symptoms were related to her medication. Denies fever/chills, cough, SOB, palpitations or V/D. Pt currently asymptomatic. In ED trop is 0.049. Per pt earlier today while waiting in ED did have a brief episode of substernal chest pain with radiation to left shoulder pressure like but has resolved on its own.     Pt admitted for near syncope and chest pain eval. Had stress test here October 2020 which was a normal study.    PLAN:       atorvastatin 40 milliGRAM(s) Oral at bedtime  clopidogrel Tablet 75 milliGRAM(s) Oral daily  gabapentin 300 milliGRAM(s) Oral daily  heparin   Injectable 5000 Unit(s) SubCutaneous every 12 hours  influenza   Vaccine 0.5 milliLiter(s) IntraMuscular once  melatonin 3 milliGRAM(s) Oral at bedtime  metoprolol succinate ER 25 milliGRAM(s) Oral daily  montelukast 10 milliGRAM(s) Oral daily  NIFEdipine XL 60 milliGRAM(s) Oral daily    may d/c home with outpt cardio f/u.  stay off of isosorbide.    Ben Peterson MD, FACC, FASE, FASNC, FACP  Director, Heart Failure Services  [facility]  , Department of Cardiology  [employer]

## 2021-03-12 DIAGNOSIS — I25.10 ATHEROSCLEROTIC HEART DISEASE OF NATIVE CORONARY ARTERY WITHOUT ANGINA PECTORIS: ICD-10-CM

## 2021-03-12 DIAGNOSIS — R55 SYNCOPE AND COLLAPSE: ICD-10-CM

## 2021-03-12 DIAGNOSIS — I10 ESSENTIAL (PRIMARY) HYPERTENSION: ICD-10-CM

## 2021-03-12 DIAGNOSIS — E78.5 HYPERLIPIDEMIA, UNSPECIFIED: ICD-10-CM

## 2021-03-12 DIAGNOSIS — R73.03 PREDIABETES: ICD-10-CM

## 2021-03-12 DIAGNOSIS — G62.9 POLYNEUROPATHY, UNSPECIFIED: ICD-10-CM

## 2021-03-12 DIAGNOSIS — R07.9 CHEST PAIN, UNSPECIFIED: ICD-10-CM

## 2021-03-12 DIAGNOSIS — J45.909 UNSPECIFIED ASTHMA, UNCOMPLICATED: ICD-10-CM

## 2021-08-26 NOTE — ED ADULT NURSE NOTE - CHPI ED NUR SYMPTOMS NEG
SSKI Counseling:  I discussed with the patient the risks of SSKI including but not limited to thyroid abnormalities, metallic taste, GI upset, fever, headache, acne, arthralgias, paraesthesias, lymphadenopathy, easy bleeding, arrhythmias, and allergic reaction. no back pain/no chills/no congestion/no diaphoresis/no dizziness/no shortness of breath

## 2021-12-01 PROCEDURE — G9005: CPT

## 2022-07-01 NOTE — ED ADULT NURSE NOTE - ED CARDIAC CAPILLARY REFILL
You have indicated that you have obtained help from family/friends to enter into a sober living home in [facility]. It is recommended that you maintain sobriety and participate in programs that promote sobriety. AA/NA are free and available resources, meeting dates and times are available at [facility].   If your sober living environment does not provide mental health services, please seek care through [facility] in [place].   
2 seconds or less

## 2022-07-26 NOTE — PATIENT PROFILE ADULT - NSPROPTRIGHTSUPPORTNAME_GEN_A_NUR
Received request via: Pharmacy    Was the patient seen in the last year in this department? Yes    Does the patient have an active prescription (recently filled or refills available) for medication(s) requested? No  
julissa díaz

## 2023-11-07 NOTE — ED ADULT TRIAGE NOTE - AS TEMP SITE
Patient is with a discharge order. Orders reviewed. No CM needs identified, patient to return home with self care. CM remains available if needed.
oral

## 2025-01-09 NOTE — ED PROVIDER NOTE - PRO INTERPRETER NEED 2
Pt requesting order for MRI due to hip/pelvic pain; states she was told by gynecologist and urologist that PCP had to order it    LOV 6/20/2024 annual    Unsure of which MRI location order to pend   English